# Patient Record
Sex: FEMALE | Race: BLACK OR AFRICAN AMERICAN | NOT HISPANIC OR LATINO | URBAN - METROPOLITAN AREA
[De-identification: names, ages, dates, MRNs, and addresses within clinical notes are randomized per-mention and may not be internally consistent; named-entity substitution may affect disease eponyms.]

---

## 2017-04-19 ENCOUNTER — EMERGENCY (EMERGENCY)
Facility: HOSPITAL | Age: 62
LOS: 1 days | Discharge: HOME | End: 2017-04-19
Attending: EMERGENCY MEDICINE
Payer: SELF-PAY

## 2017-04-19 VITALS
OXYGEN SATURATION: 96 % | TEMPERATURE: 98 F | RESPIRATION RATE: 18 BRPM | HEART RATE: 89 BPM | DIASTOLIC BLOOD PRESSURE: 76 MMHG | SYSTOLIC BLOOD PRESSURE: 149 MMHG

## 2017-04-19 VITALS
DIASTOLIC BLOOD PRESSURE: 110 MMHG | TEMPERATURE: 98 F | HEART RATE: 82 BPM | WEIGHT: 160.06 LBS | RESPIRATION RATE: 16 BRPM | SYSTOLIC BLOOD PRESSURE: 169 MMHG | OXYGEN SATURATION: 100 %

## 2017-04-19 DIAGNOSIS — S02.2XXA FRACTURE OF NASAL BONES, INITIAL ENCOUNTER FOR CLOSED FRACTURE: ICD-10-CM
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
## 2017-04-19 DIAGNOSIS — F17.200 NICOTINE DEPENDENCE, UNSPECIFIED, UNCOMPLICATED: ICD-10-CM

## 2017-04-19 DIAGNOSIS — S01.81XA LACERATION WITHOUT FOREIGN BODY OF OTHER PART OF HEAD, INITIAL ENCOUNTER: ICD-10-CM
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
## 2017-04-19 DIAGNOSIS — S09.90XA UNSPECIFIED INJURY OF HEAD, INITIAL ENCOUNTER: ICD-10-CM
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
## 2017-04-19 LAB
ALBUMIN SERPL ELPH-MCNC: 4.2 G/DL — SIGNIFICANT CHANGE UP (ref 3.3–5.2)
ALP SERPL-CCNC: 87 U/L — SIGNIFICANT CHANGE UP (ref 40–120)
ALT FLD-CCNC: 17 U/L — SIGNIFICANT CHANGE UP
ANION GAP SERPL CALC-SCNC: 12 MMOL/L — SIGNIFICANT CHANGE UP (ref 5–17)
APTT BLD: 28.6 SEC — SIGNIFICANT CHANGE UP (ref 27.5–37.4)
AST SERPL-CCNC: 25 U/L — SIGNIFICANT CHANGE UP
BASOPHILS # BLD AUTO: 0 K/UL — SIGNIFICANT CHANGE UP (ref 0–0.2)
BASOPHILS NFR BLD AUTO: 0.1 % — SIGNIFICANT CHANGE UP (ref 0–2)
BILIRUB SERPL-MCNC: 0.5 MG/DL — SIGNIFICANT CHANGE UP (ref 0.4–2)
BUN SERPL-MCNC: 24 MG/DL — HIGH (ref 8–20)
CALCIUM SERPL-MCNC: 9.5 MG/DL — SIGNIFICANT CHANGE UP (ref 8.6–10.2)
CHLORIDE SERPL-SCNC: 93 MMOL/L — LOW (ref 98–107)
CK MB CFR SERPL CALC: 11.9 NG/ML — HIGH (ref 0–6.7)
CK SERPL-CCNC: 468 U/L — HIGH (ref 25–170)
CO2 SERPL-SCNC: 26 MMOL/L — SIGNIFICANT CHANGE UP (ref 22–29)
CREAT SERPL-MCNC: 0.81 MG/DL — SIGNIFICANT CHANGE UP (ref 0.5–1.3)
EOSINOPHIL # BLD AUTO: 0 K/UL — SIGNIFICANT CHANGE UP (ref 0–0.5)
EOSINOPHIL NFR BLD AUTO: 0.1 % — SIGNIFICANT CHANGE UP (ref 0–6)
ETHANOL SERPL-MCNC: <10 MG/DL — SIGNIFICANT CHANGE UP
GLUCOSE SERPL-MCNC: 336 MG/DL — HIGH (ref 70–115)
HCT VFR BLD CALC: 40 % — SIGNIFICANT CHANGE UP (ref 37–47)
HGB BLD-MCNC: 13.7 G/DL — SIGNIFICANT CHANGE UP (ref 12–16)
INR BLD: 1 RATIO — SIGNIFICANT CHANGE UP (ref 0.88–1.16)
LYMPHOCYTES # BLD AUTO: 1.4 K/UL — SIGNIFICANT CHANGE UP (ref 1–4.8)
LYMPHOCYTES # BLD AUTO: 8.7 % — LOW (ref 20–55)
MCHC RBC-ENTMCNC: 31.1 PG — HIGH (ref 27–31)
MCHC RBC-ENTMCNC: 34.3 G/DL — SIGNIFICANT CHANGE UP (ref 32–36)
MCV RBC AUTO: 90.7 FL — SIGNIFICANT CHANGE UP (ref 81–99)
MONOCYTES # BLD AUTO: 0.9 K/UL — HIGH (ref 0–0.8)
MONOCYTES NFR BLD AUTO: 5.7 % — SIGNIFICANT CHANGE UP (ref 3–10)
NEUTROPHILS # BLD AUTO: 13.8 K/UL — HIGH (ref 1.8–8)
NEUTROPHILS NFR BLD AUTO: 85.2 % — HIGH (ref 37–73)
PLATELET # BLD AUTO: 265 K/UL — SIGNIFICANT CHANGE UP (ref 150–400)
POTASSIUM SERPL-MCNC: 4.4 MMOL/L — SIGNIFICANT CHANGE UP (ref 3.5–5.3)
POTASSIUM SERPL-SCNC: 4.4 MMOL/L — SIGNIFICANT CHANGE UP (ref 3.5–5.3)
PROT SERPL-MCNC: 7.8 G/DL — SIGNIFICANT CHANGE UP (ref 6.6–8.7)
PROTHROM AB SERPL-ACNC: 11 SEC — SIGNIFICANT CHANGE UP (ref 9.8–12.7)
RBC # BLD: 4.41 M/UL — SIGNIFICANT CHANGE UP (ref 4.4–5.2)
RBC # FLD: 12.4 % — SIGNIFICANT CHANGE UP (ref 11–15.6)
SODIUM SERPL-SCNC: 131 MMOL/L — LOW (ref 135–145)
TROPONIN T SERPL-MCNC: <0.01 NG/ML — SIGNIFICANT CHANGE UP (ref 0–0.06)
WBC # BLD: 16.2 K/UL — HIGH (ref 4.8–10.8)
WBC # FLD AUTO: 16.2 K/UL — HIGH (ref 4.8–10.8)

## 2017-04-19 PROCEDURE — 82550 ASSAY OF CK (CPK): CPT

## 2017-04-19 PROCEDURE — 99284 EMERGENCY DEPT VISIT MOD MDM: CPT | Mod: 25

## 2017-04-19 PROCEDURE — 12011 RPR F/E/E/N/L/M 2.5 CM/<: CPT

## 2017-04-19 PROCEDURE — 80307 DRUG TEST PRSMV CHEM ANLYZR: CPT

## 2017-04-19 PROCEDURE — 72125 CT NECK SPINE W/O DYE: CPT | Mod: 26

## 2017-04-19 PROCEDURE — 96375 TX/PRO/DX INJ NEW DRUG ADDON: CPT | Mod: XU

## 2017-04-19 PROCEDURE — 70486 CT MAXILLOFACIAL W/O DYE: CPT

## 2017-04-19 PROCEDURE — 85610 PROTHROMBIN TIME: CPT

## 2017-04-19 PROCEDURE — 93010 ELECTROCARDIOGRAM REPORT: CPT

## 2017-04-19 PROCEDURE — 99285 EMERGENCY DEPT VISIT HI MDM: CPT | Mod: 25

## 2017-04-19 PROCEDURE — 71260 CT THORAX DX C+: CPT | Mod: 26

## 2017-04-19 PROCEDURE — 96374 THER/PROPH/DIAG INJ IV PUSH: CPT | Mod: XU

## 2017-04-19 PROCEDURE — 70450 CT HEAD/BRAIN W/O DYE: CPT

## 2017-04-19 PROCEDURE — 84484 ASSAY OF TROPONIN QUANT: CPT

## 2017-04-19 PROCEDURE — 71260 CT THORAX DX C+: CPT

## 2017-04-19 PROCEDURE — 72125 CT NECK SPINE W/O DYE: CPT

## 2017-04-19 PROCEDURE — 80053 COMPREHEN METABOLIC PANEL: CPT

## 2017-04-19 PROCEDURE — 74177 CT ABD & PELVIS W/CONTRAST: CPT | Mod: 26

## 2017-04-19 PROCEDURE — 70450 CT HEAD/BRAIN W/O DYE: CPT | Mod: 26

## 2017-04-19 PROCEDURE — 82553 CREATINE MB FRACTION: CPT

## 2017-04-19 PROCEDURE — 85730 THROMBOPLASTIN TIME PARTIAL: CPT

## 2017-04-19 PROCEDURE — 70486 CT MAXILLOFACIAL W/O DYE: CPT | Mod: 26

## 2017-04-19 PROCEDURE — 85027 COMPLETE CBC AUTOMATED: CPT

## 2017-04-19 PROCEDURE — 93005 ELECTROCARDIOGRAM TRACING: CPT

## 2017-04-19 PROCEDURE — 74177 CT ABD & PELVIS W/CONTRAST: CPT

## 2017-04-19 RX ORDER — AMOXICILLIN 250 MG/5ML
1 SUSPENSION, RECONSTITUTED, ORAL (ML) ORAL
Qty: 30 | Refills: 0 | OUTPATIENT
Start: 2017-04-19 | End: 2017-04-29

## 2017-04-19 RX ORDER — ONDANSETRON 8 MG/1
4 TABLET, FILM COATED ORAL ONCE
Qty: 0 | Refills: 0 | Status: COMPLETED | OUTPATIENT
Start: 2017-04-19 | End: 2017-04-19

## 2017-04-19 RX ORDER — SODIUM CHLORIDE 9 MG/ML
500 INJECTION INTRAMUSCULAR; INTRAVENOUS; SUBCUTANEOUS ONCE
Qty: 0 | Refills: 0 | Status: COMPLETED | OUTPATIENT
Start: 2017-04-19 | End: 2017-04-19

## 2017-04-19 RX ORDER — MORPHINE SULFATE 50 MG/1
4 CAPSULE, EXTENDED RELEASE ORAL ONCE
Qty: 0 | Refills: 0 | Status: DISCONTINUED | OUTPATIENT
Start: 2017-04-19 | End: 2017-04-19

## 2017-04-19 RX ORDER — AMPICILLIN SODIUM AND SULBACTAM SODIUM 250; 125 MG/ML; MG/ML
2 INJECTION, POWDER, FOR SUSPENSION INTRAMUSCULAR; INTRAVENOUS ONCE
Qty: 0 | Refills: 0 | Status: COMPLETED | OUTPATIENT
Start: 2017-04-19 | End: 2017-04-19

## 2017-04-19 RX ORDER — AMOXICILLIN 250 MG/5ML
1 SUSPENSION, RECONSTITUTED, ORAL (ML) ORAL
Qty: 30 | Refills: 0
Start: 2017-04-19 | End: 2017-04-29

## 2017-04-19 RX ADMIN — MORPHINE SULFATE 4 MILLIGRAM(S): 50 CAPSULE, EXTENDED RELEASE ORAL at 11:17

## 2017-04-19 RX ADMIN — AMPICILLIN SODIUM AND SULBACTAM SODIUM 200 GRAM(S): 250; 125 INJECTION, POWDER, FOR SUSPENSION INTRAMUSCULAR; INTRAVENOUS at 18:36

## 2017-04-19 RX ADMIN — SODIUM CHLORIDE 500 MILLILITER(S): 9 INJECTION INTRAMUSCULAR; INTRAVENOUS; SUBCUTANEOUS at 16:42

## 2017-04-19 RX ADMIN — ONDANSETRON 4 MILLIGRAM(S): 8 TABLET, FILM COATED ORAL at 11:17

## 2017-04-19 RX ADMIN — MORPHINE SULFATE 4 MILLIGRAM(S): 50 CAPSULE, EXTENDED RELEASE ORAL at 12:24

## 2017-04-19 NOTE — ED PROVIDER NOTE - CARE PLAN
Principal Discharge DX:	Injury of head, initial encounter Principal Discharge DX:	Injury of head, initial encounter  Secondary Diagnosis:	Closed fracture of nasal bone, initial encounter  Secondary Diagnosis:	Laceration

## 2017-04-19 NOTE — ED ADULT NURSE REASSESSMENT NOTE - NS ED NURSE REASSESS COMMENT FT1
rcd pt on stretcher, in no apparent distress, pt a/ox3, swelling and dry blood noted to lip and nose, awaiting suture, complains she wants to eat, per MD Vega pt can have liquids, family at bedside will continue to monitor

## 2017-04-19 NOTE — ED PROVIDER NOTE - OBJECTIVE STATEMENT
63 yo female  pmh dm comes st ed s/p fall down  steps  pt with trauma to face and mouth; pt states has pain to rt side of neck; denies chest, abdominal and back pain; unknown loc

## 2017-04-19 NOTE — ED ADULT TRIAGE NOTE - CHIEF COMPLAINT QUOTE
pt presents to ed with unclear story. states that she believes she fell at approx 330 am because it was dark and she couldn't see. unknown loc. dr solitario to bedside. patient with swollen lower part of face, bleeding to nose, broken teeth and right sided c spine tenderness. arrives in collar. no anticoags.

## 2018-09-21 NOTE — ED ADULT NURSE NOTE - CHIEF COMPLAINT QUOTE
pt presents to ed with unclear story. states that she believes she fell at approx 330 am because it was dark and she couldn't see. unknown loc. dr solitario to bedside. patient with swollen lower part of face, bleeding to nose, broken teeth and right sided c spine tenderness. arrives in collar. no anticoags. declined

## 2021-09-16 ENCOUNTER — INPATIENT (INPATIENT)
Facility: HOSPITAL | Age: 66
LOS: 5 days | Discharge: HOME HEALTH | End: 2021-09-22
Attending: HOSPITALIST | Admitting: HOSPITALIST
Payer: MEDICARE

## 2021-09-16 VITALS
SYSTOLIC BLOOD PRESSURE: 224 MMHG | OXYGEN SATURATION: 99 % | DIASTOLIC BLOOD PRESSURE: 114 MMHG | TEMPERATURE: 97 F | HEART RATE: 99 BPM | RESPIRATION RATE: 16 BRPM

## 2021-09-16 DIAGNOSIS — Z87.81 PERSONAL HISTORY OF (HEALED) TRAUMATIC FRACTURE: Chronic | ICD-10-CM

## 2021-09-16 DIAGNOSIS — I16.1 HYPERTENSIVE EMERGENCY: ICD-10-CM

## 2021-09-16 LAB
A1C WITH ESTIMATED AVERAGE GLUCOSE RESULT: 12.2 % — HIGH (ref 4–5.6)
ALBUMIN SERPL ELPH-MCNC: 4.6 G/DL — SIGNIFICANT CHANGE UP (ref 3.3–5)
ALP SERPL-CCNC: 78 U/L — SIGNIFICANT CHANGE UP (ref 40–120)
ALT FLD-CCNC: 23 U/L — SIGNIFICANT CHANGE UP (ref 4–33)
ANION GAP SERPL CALC-SCNC: 16 MMOL/L — HIGH (ref 7–14)
APPEARANCE UR: CLEAR — SIGNIFICANT CHANGE UP
AST SERPL-CCNC: 30 U/L — SIGNIFICANT CHANGE UP (ref 4–32)
BACTERIA # UR AUTO: ABNORMAL
BASE EXCESS BLDV CALC-SCNC: -3.7 MMOL/L — LOW (ref -2–3)
BASOPHILS # BLD AUTO: 0.05 K/UL — SIGNIFICANT CHANGE UP (ref 0–0.2)
BASOPHILS NFR BLD AUTO: 0.6 % — SIGNIFICANT CHANGE UP (ref 0–2)
BILIRUB SERPL-MCNC: 0.2 MG/DL — SIGNIFICANT CHANGE UP (ref 0.2–1.2)
BILIRUB UR-MCNC: NEGATIVE — SIGNIFICANT CHANGE UP
BLOOD GAS VENOUS COMPREHENSIVE RESULT: SIGNIFICANT CHANGE UP
BUN SERPL-MCNC: 33 MG/DL — HIGH (ref 7–23)
CALCIUM SERPL-MCNC: 9.4 MG/DL — SIGNIFICANT CHANGE UP (ref 8.4–10.5)
CHLORIDE BLDV-SCNC: 105 MMOL/L — SIGNIFICANT CHANGE UP (ref 96–108)
CHLORIDE SERPL-SCNC: 100 MMOL/L — SIGNIFICANT CHANGE UP (ref 98–107)
CO2 BLDV-SCNC: 23.4 MMOL/L — SIGNIFICANT CHANGE UP (ref 22–26)
CO2 SERPL-SCNC: 19 MMOL/L — LOW (ref 22–31)
COLOR SPEC: SIGNIFICANT CHANGE UP
CREAT SERPL-MCNC: 2.09 MG/DL — HIGH (ref 0.5–1.3)
DIFF PNL FLD: ABNORMAL
EOSINOPHIL # BLD AUTO: 0.1 K/UL — SIGNIFICANT CHANGE UP (ref 0–0.5)
EOSINOPHIL NFR BLD AUTO: 1.1 % — SIGNIFICANT CHANGE UP (ref 0–6)
EPI CELLS # UR: 2 /HPF — SIGNIFICANT CHANGE UP (ref 0–5)
ESTIMATED AVERAGE GLUCOSE: 303 — SIGNIFICANT CHANGE UP
GAS PNL BLDV: 135 MMOL/L — LOW (ref 136–145)
GLUCOSE BLDV-MCNC: 272 MG/DL — HIGH (ref 70–99)
GLUCOSE SERPL-MCNC: 307 MG/DL — HIGH (ref 70–99)
GLUCOSE UR QL: ABNORMAL
HCO3 BLDV-SCNC: 22 MMOL/L — SIGNIFICANT CHANGE UP (ref 22–29)
HCT VFR BLD CALC: 32.5 % — LOW (ref 34.5–45)
HCT VFR BLDA CALC: 31 % — LOW (ref 34.5–46.5)
HGB BLD CALC-MCNC: 10.3 G/DL — LOW (ref 11.5–15.5)
HGB BLD-MCNC: 11 G/DL — LOW (ref 11.5–15.5)
HYALINE CASTS # UR AUTO: 0 /LPF — SIGNIFICANT CHANGE UP (ref 0–7)
IANC: 6.13 K/UL — SIGNIFICANT CHANGE UP (ref 1.5–8.5)
IMM GRANULOCYTES NFR BLD AUTO: 0.2 % — SIGNIFICANT CHANGE UP (ref 0–1.5)
KETONES UR-MCNC: NEGATIVE — SIGNIFICANT CHANGE UP
LACTATE BLDV-MCNC: 1 MMOL/L — SIGNIFICANT CHANGE UP (ref 0.5–2)
LEUKOCYTE ESTERASE UR-ACNC: NEGATIVE — SIGNIFICANT CHANGE UP
LYMPHOCYTES # BLD AUTO: 2.07 K/UL — SIGNIFICANT CHANGE UP (ref 1–3.3)
LYMPHOCYTES # BLD AUTO: 22.8 % — SIGNIFICANT CHANGE UP (ref 13–44)
MCHC RBC-ENTMCNC: 28.9 PG — SIGNIFICANT CHANGE UP (ref 27–34)
MCHC RBC-ENTMCNC: 33.8 GM/DL — SIGNIFICANT CHANGE UP (ref 32–36)
MCV RBC AUTO: 85.3 FL — SIGNIFICANT CHANGE UP (ref 80–100)
MONOCYTES # BLD AUTO: 0.72 K/UL — SIGNIFICANT CHANGE UP (ref 0–0.9)
MONOCYTES NFR BLD AUTO: 7.9 % — SIGNIFICANT CHANGE UP (ref 2–14)
NEUTROPHILS # BLD AUTO: 6.13 K/UL — SIGNIFICANT CHANGE UP (ref 1.8–7.4)
NEUTROPHILS NFR BLD AUTO: 67.4 % — SIGNIFICANT CHANGE UP (ref 43–77)
NITRITE UR-MCNC: NEGATIVE — SIGNIFICANT CHANGE UP
NRBC # BLD: 0 /100 WBCS — SIGNIFICANT CHANGE UP
NRBC # FLD: 0 K/UL — SIGNIFICANT CHANGE UP
NT-PROBNP SERPL-SCNC: 536 PG/ML — HIGH
PCO2 BLDV: 42 MMHG — SIGNIFICANT CHANGE UP (ref 39–42)
PH BLDV: 7.33 — SIGNIFICANT CHANGE UP (ref 7.32–7.43)
PH UR: 6.5 — SIGNIFICANT CHANGE UP (ref 5–8)
PLATELET # BLD AUTO: 262 K/UL — SIGNIFICANT CHANGE UP (ref 150–400)
PO2 BLDV: 32 MMHG — SIGNIFICANT CHANGE UP
POTASSIUM BLDV-SCNC: 4.1 MMOL/L — SIGNIFICANT CHANGE UP (ref 3.5–5.1)
POTASSIUM SERPL-MCNC: 4.1 MMOL/L — SIGNIFICANT CHANGE UP (ref 3.5–5.3)
POTASSIUM SERPL-SCNC: 4.1 MMOL/L — SIGNIFICANT CHANGE UP (ref 3.5–5.3)
PROT SERPL-MCNC: 7.9 G/DL — SIGNIFICANT CHANGE UP (ref 6–8.3)
PROT UR-MCNC: ABNORMAL
RBC # BLD: 3.81 M/UL — SIGNIFICANT CHANGE UP (ref 3.8–5.2)
RBC # FLD: 12.1 % — SIGNIFICANT CHANGE UP (ref 10.3–14.5)
RBC CASTS # UR COMP ASSIST: 3 /HPF — SIGNIFICANT CHANGE UP (ref 0–4)
SAO2 % BLDV: 56.2 % — SIGNIFICANT CHANGE UP
SODIUM SERPL-SCNC: 135 MMOL/L — SIGNIFICANT CHANGE UP (ref 135–145)
SP GR SPEC: 1.01 — SIGNIFICANT CHANGE UP (ref 1–1.05)
TROPONIN T, HIGH SENSITIVITY RESULT: 27 NG/L — SIGNIFICANT CHANGE UP
TROPONIN T, HIGH SENSITIVITY RESULT: 28 NG/L — SIGNIFICANT CHANGE UP
UROBILINOGEN FLD QL: SIGNIFICANT CHANGE UP
WBC # BLD: 9.09 K/UL — SIGNIFICANT CHANGE UP (ref 3.8–10.5)
WBC # FLD AUTO: 9.09 K/UL — SIGNIFICANT CHANGE UP (ref 3.8–10.5)
WBC UR QL: 2 /HPF — SIGNIFICANT CHANGE UP (ref 0–5)

## 2021-09-16 PROCEDURE — 71045 X-RAY EXAM CHEST 1 VIEW: CPT | Mod: 26

## 2021-09-16 PROCEDURE — 99285 EMERGENCY DEPT VISIT HI MDM: CPT

## 2021-09-16 RX ORDER — LABETALOL HCL 100 MG
10 TABLET ORAL ONCE
Refills: 0 | Status: COMPLETED | OUTPATIENT
Start: 2021-09-16 | End: 2021-09-16

## 2021-09-16 RX ADMIN — Medication 10 MILLIGRAM(S): at 20:21

## 2021-09-16 NOTE — ED PROVIDER NOTE - PHYSICAL EXAMINATION
Attending/Oren: Well-appearing, NAD; PERRL/EOMI, non-icterus, supple, no CHACHO, no JVD, RRR, CTAB; Abd-soft, NT/ND, no HSM; +1 pitting LE edema, A&Ox3, nonfocal; Skin-warm/dry General: NAD  HEENT: NCAT, PERRL  Cardiac: RRR, no murmurs, 2+ peripheral pulses  Chest: CTA  Abdomen: soft, non-distended, bowel sounds present, no ttp, no rebound or guarding, -cva ttp  Extremities: 1+peripheral edema bilaterally, no calf tenderness, or leg size discrepancies  Skin: no rashes  Neuro: AAOx3, motor and sensory grossly intact  Psych: mood and affect appropriate      Attending/Oren: Well-appearing, NAD; PERRL/EOMI, non-icterus, supple, no CHACHO, no JVD, RRR, CTAB; Abd-soft, NT/ND, no HSM; +1 pitting LE edema, A&Ox3, nonfocal; Skin-warm/dry

## 2021-09-16 NOTE — ED PROVIDER NOTE - OBJECTIVE STATEMENT
Attending/Oren: 65 yo F w h/o HTN, DM p/w ~ two days of generalized weakness, lightheadedness, and LE swelling. Pt denies CP, SOB/MARCELO, orthopnea or palp. Pt was seen and eval by her PMD today ([facility]-Dr. Geni Fairbanks) and referred to the ED.    Januvia 25mg qD; Amlodipine 10mg qD; Losartan 100mg qD

## 2021-09-16 NOTE — ED PROVIDER NOTE - PROGRESS NOTE DETAILS
ABDI Batista (PGY-2) - BP from 200s to 189 systolic s/p labetalol. labs w/ mildly elevated bmp, creatinine for 2 w/o electrolyte issues. Trop 28 will rpt for delta.

## 2021-09-16 NOTE — ED ADULT NURSE NOTE - CHIEF COMPLAINT QUOTE
Pt presents to ED for high blood pressure and b/l LE swelling x4days. Denies headache, chest pain, dizziness, lightheadedness. PMhx of HTN, DM

## 2021-09-16 NOTE — ED PROVIDER NOTE - CLINICAL SUMMARY MEDICAL DECISION MAKING FREE TEXT BOX
A/P 67 yo HTN, DM referred for hypertensive urgency, acute renal injury  -EKG, labs, CXR, blood pressure management, CDU/admit

## 2021-09-16 NOTE — ED ADULT NURSE NOTE - OBJECTIVE STATEMENT
Donna RN: 67 y/o F received to room 1 c/o HTN. Pt a&ox4 and ambulatory. pt primarily Zimbabwean speaking but able to make needs known in english. Pt states for 2 days shes had swelling to her legs, weakness and lightheadedness. Pt states she was seen at her pcp and was told to come to ed for HTN. Pt respirations even and unlabored. Pt abd soft nontender nondistended. Pt skin intact. Pt denies fevers, chills, sob, luna, ha, cp, palpitations. VS as noted, call bell in reach, comfort measures provided, 20G IV Placed L wrist labs drawn and sent, will give report to primary RN.

## 2021-09-16 NOTE — ED ADULT NURSE NOTE - ED STAT RN HANDOFF DETAILS
Report given to UCHE Redd. Pt a&ox4 and ambulatory. Pt respirations even and unlabored. pt aware of plan of care. VS as noted, call bell in reach, will continue to monitor till transport.

## 2021-09-17 DIAGNOSIS — Z29.9 ENCOUNTER FOR PROPHYLACTIC MEASURES, UNSPECIFIED: ICD-10-CM

## 2021-09-17 DIAGNOSIS — I16.0 HYPERTENSIVE URGENCY: ICD-10-CM

## 2021-09-17 DIAGNOSIS — R60.0 LOCALIZED EDEMA: ICD-10-CM

## 2021-09-17 DIAGNOSIS — I10 ESSENTIAL (PRIMARY) HYPERTENSION: ICD-10-CM

## 2021-09-17 DIAGNOSIS — R94.31 ABNORMAL ELECTROCARDIOGRAM [ECG] [EKG]: ICD-10-CM

## 2021-09-17 DIAGNOSIS — R09.89 OTHER SPECIFIED SYMPTOMS AND SIGNS INVOLVING THE CIRCULATORY AND RESPIRATORY SYSTEMS: ICD-10-CM

## 2021-09-17 DIAGNOSIS — Z02.9 ENCOUNTER FOR ADMINISTRATIVE EXAMINATIONS, UNSPECIFIED: ICD-10-CM

## 2021-09-17 DIAGNOSIS — E78.5 HYPERLIPIDEMIA, UNSPECIFIED: ICD-10-CM

## 2021-09-17 DIAGNOSIS — E11.65 TYPE 2 DIABETES MELLITUS WITH HYPERGLYCEMIA: ICD-10-CM

## 2021-09-17 DIAGNOSIS — N17.9 ACUTE KIDNEY FAILURE, UNSPECIFIED: ICD-10-CM

## 2021-09-17 LAB
A1C WITH ESTIMATED AVERAGE GLUCOSE RESULT: 12.4 % — HIGH (ref 4–5.6)
ANION GAP SERPL CALC-SCNC: 11 MMOL/L — SIGNIFICANT CHANGE UP (ref 7–14)
BASOPHILS # BLD AUTO: 0.04 K/UL — SIGNIFICANT CHANGE UP (ref 0–0.2)
BASOPHILS NFR BLD AUTO: 0.5 % — SIGNIFICANT CHANGE UP (ref 0–2)
BUN SERPL-MCNC: 28 MG/DL — HIGH (ref 7–23)
CALCIUM SERPL-MCNC: 9.5 MG/DL — SIGNIFICANT CHANGE UP (ref 8.4–10.5)
CHLORIDE SERPL-SCNC: 103 MMOL/L — SIGNIFICANT CHANGE UP (ref 98–107)
CHOLEST SERPL-MCNC: 386 MG/DL — HIGH
CO2 SERPL-SCNC: 25 MMOL/L — SIGNIFICANT CHANGE UP (ref 22–31)
CREAT SERPL-MCNC: 1.84 MG/DL — HIGH (ref 0.5–1.3)
EOSINOPHIL # BLD AUTO: 0.1 K/UL — SIGNIFICANT CHANGE UP (ref 0–0.5)
EOSINOPHIL NFR BLD AUTO: 1.3 % — SIGNIFICANT CHANGE UP (ref 0–6)
ESTIMATED AVERAGE GLUCOSE: 309 — SIGNIFICANT CHANGE UP
GLUCOSE BLDC GLUCOMTR-MCNC: 248 MG/DL — HIGH (ref 70–99)
GLUCOSE BLDC GLUCOMTR-MCNC: 260 MG/DL — HIGH (ref 70–99)
GLUCOSE BLDC GLUCOMTR-MCNC: 384 MG/DL — HIGH (ref 70–99)
GLUCOSE SERPL-MCNC: 266 MG/DL — HIGH (ref 70–99)
HCT VFR BLD CALC: 32.3 % — LOW (ref 34.5–45)
HDLC SERPL-MCNC: 53 MG/DL — SIGNIFICANT CHANGE UP
HGB BLD-MCNC: 11 G/DL — LOW (ref 11.5–15.5)
IANC: 5.4 K/UL — SIGNIFICANT CHANGE UP (ref 1.5–8.5)
IMM GRANULOCYTES NFR BLD AUTO: 0.3 % — SIGNIFICANT CHANGE UP (ref 0–1.5)
LIPID PNL WITH DIRECT LDL SERPL: 277 MG/DL — HIGH
LYMPHOCYTES # BLD AUTO: 1.57 K/UL — SIGNIFICANT CHANGE UP (ref 1–3.3)
LYMPHOCYTES # BLD AUTO: 20.6 % — SIGNIFICANT CHANGE UP (ref 13–44)
MAGNESIUM SERPL-MCNC: 2.2 MG/DL — SIGNIFICANT CHANGE UP (ref 1.6–2.6)
MCHC RBC-ENTMCNC: 28.7 PG — SIGNIFICANT CHANGE UP (ref 27–34)
MCHC RBC-ENTMCNC: 34.1 GM/DL — SIGNIFICANT CHANGE UP (ref 32–36)
MCV RBC AUTO: 84.3 FL — SIGNIFICANT CHANGE UP (ref 80–100)
MONOCYTES # BLD AUTO: 0.49 K/UL — SIGNIFICANT CHANGE UP (ref 0–0.9)
MONOCYTES NFR BLD AUTO: 6.4 % — SIGNIFICANT CHANGE UP (ref 2–14)
NEUTROPHILS # BLD AUTO: 5.4 K/UL — SIGNIFICANT CHANGE UP (ref 1.8–7.4)
NEUTROPHILS NFR BLD AUTO: 70.9 % — SIGNIFICANT CHANGE UP (ref 43–77)
NON HDL CHOLESTEROL: 333 MG/DL — HIGH
NRBC # BLD: 0 /100 WBCS — SIGNIFICANT CHANGE UP
NRBC # FLD: 0 K/UL — SIGNIFICANT CHANGE UP
PHOSPHATE SERPL-MCNC: 2.9 MG/DL — SIGNIFICANT CHANGE UP (ref 2.5–4.5)
PLATELET # BLD AUTO: 249 K/UL — SIGNIFICANT CHANGE UP (ref 150–400)
POTASSIUM SERPL-MCNC: 4 MMOL/L — SIGNIFICANT CHANGE UP (ref 3.5–5.3)
POTASSIUM SERPL-SCNC: 4 MMOL/L — SIGNIFICANT CHANGE UP (ref 3.5–5.3)
RBC # BLD: 3.83 M/UL — SIGNIFICANT CHANGE UP (ref 3.8–5.2)
RBC # FLD: 12 % — SIGNIFICANT CHANGE UP (ref 10.3–14.5)
SARS-COV-2 RNA SPEC QL NAA+PROBE: SIGNIFICANT CHANGE UP
SODIUM SERPL-SCNC: 139 MMOL/L — SIGNIFICANT CHANGE UP (ref 135–145)
TRIGL SERPL-MCNC: 281 MG/DL — HIGH
TSH SERPL-MCNC: 1.48 UIU/ML — SIGNIFICANT CHANGE UP (ref 0.27–4.2)
WBC # BLD: 7.62 K/UL — SIGNIFICANT CHANGE UP (ref 3.8–10.5)
WBC # FLD AUTO: 7.62 K/UL — SIGNIFICANT CHANGE UP (ref 3.8–10.5)

## 2021-09-17 PROCEDURE — 99223 1ST HOSP IP/OBS HIGH 75: CPT

## 2021-09-17 PROCEDURE — 93010 ELECTROCARDIOGRAM REPORT: CPT

## 2021-09-17 PROCEDURE — 12345: CPT | Mod: NC

## 2021-09-17 RX ORDER — LABETALOL HCL 100 MG
100 TABLET ORAL EVERY 12 HOURS
Refills: 0 | Status: DISCONTINUED | OUTPATIENT
Start: 2021-09-17 | End: 2021-09-22

## 2021-09-17 RX ORDER — HEPARIN SODIUM 5000 [USP'U]/ML
5000 INJECTION INTRAVENOUS; SUBCUTANEOUS EVERY 8 HOURS
Refills: 0 | Status: DISCONTINUED | OUTPATIENT
Start: 2021-09-17 | End: 2021-09-22

## 2021-09-17 RX ORDER — INSULIN LISPRO 100/ML
4 VIAL (ML) SUBCUTANEOUS ONCE
Refills: 0 | Status: COMPLETED | OUTPATIENT
Start: 2021-09-17 | End: 2021-09-17

## 2021-09-17 RX ORDER — ATORVASTATIN CALCIUM 80 MG/1
40 TABLET, FILM COATED ORAL AT BEDTIME
Refills: 0 | Status: DISCONTINUED | OUTPATIENT
Start: 2021-09-17 | End: 2021-09-22

## 2021-09-17 RX ORDER — INSULIN LISPRO 100/ML
5 VIAL (ML) SUBCUTANEOUS
Refills: 0 | Status: DISCONTINUED | OUTPATIENT
Start: 2021-09-17 | End: 2021-09-18

## 2021-09-17 RX ORDER — INSULIN LISPRO 100/ML
3 VIAL (ML) SUBCUTANEOUS
Refills: 0 | Status: DISCONTINUED | OUTPATIENT
Start: 2021-09-17 | End: 2021-09-17

## 2021-09-17 RX ORDER — INFLUENZA VIRUS VACCINE 15; 15; 15; 15 UG/.5ML; UG/.5ML; UG/.5ML; UG/.5ML
0.5 SUSPENSION INTRAMUSCULAR ONCE
Refills: 0 | Status: DISCONTINUED | OUTPATIENT
Start: 2021-09-17 | End: 2021-09-22

## 2021-09-17 RX ORDER — DEXTROSE 50 % IN WATER 50 %
25 SYRINGE (ML) INTRAVENOUS ONCE
Refills: 0 | Status: DISCONTINUED | OUTPATIENT
Start: 2021-09-17 | End: 2021-09-22

## 2021-09-17 RX ORDER — DEXTROSE 50 % IN WATER 50 %
15 SYRINGE (ML) INTRAVENOUS ONCE
Refills: 0 | Status: DISCONTINUED | OUTPATIENT
Start: 2021-09-17 | End: 2021-09-22

## 2021-09-17 RX ORDER — INSULIN GLARGINE 100 [IU]/ML
12 INJECTION, SOLUTION SUBCUTANEOUS AT BEDTIME
Refills: 0 | Status: DISCONTINUED | OUTPATIENT
Start: 2021-09-17 | End: 2021-09-18

## 2021-09-17 RX ORDER — GLUCAGON INJECTION, SOLUTION 0.5 MG/.1ML
1 INJECTION, SOLUTION SUBCUTANEOUS ONCE
Refills: 0 | Status: DISCONTINUED | OUTPATIENT
Start: 2021-09-17 | End: 2021-09-22

## 2021-09-17 RX ORDER — INSULIN LISPRO 100/ML
VIAL (ML) SUBCUTANEOUS
Refills: 0 | Status: DISCONTINUED | OUTPATIENT
Start: 2021-09-17 | End: 2021-09-17

## 2021-09-17 RX ORDER — SODIUM CHLORIDE 9 MG/ML
1000 INJECTION, SOLUTION INTRAVENOUS
Refills: 0 | Status: DISCONTINUED | OUTPATIENT
Start: 2021-09-17 | End: 2021-09-22

## 2021-09-17 RX ORDER — INSULIN GLARGINE 100 [IU]/ML
4 INJECTION, SOLUTION SUBCUTANEOUS AT BEDTIME
Refills: 0 | Status: DISCONTINUED | OUTPATIENT
Start: 2021-09-17 | End: 2021-09-17

## 2021-09-17 RX ORDER — AMLODIPINE BESYLATE 2.5 MG/1
10 TABLET ORAL EVERY 24 HOURS
Refills: 0 | Status: DISCONTINUED | OUTPATIENT
Start: 2021-09-17 | End: 2021-09-17

## 2021-09-17 RX ORDER — INSULIN LISPRO 100/ML
VIAL (ML) SUBCUTANEOUS AT BEDTIME
Refills: 0 | Status: DISCONTINUED | OUTPATIENT
Start: 2021-09-17 | End: 2021-09-22

## 2021-09-17 RX ORDER — AMLODIPINE BESYLATE 2.5 MG/1
10 TABLET ORAL EVERY 24 HOURS
Refills: 0 | Status: DISCONTINUED | OUTPATIENT
Start: 2021-09-17 | End: 2021-09-22

## 2021-09-17 RX ORDER — INSULIN LISPRO 100/ML
VIAL (ML) SUBCUTANEOUS
Refills: 0 | Status: DISCONTINUED | OUTPATIENT
Start: 2021-09-17 | End: 2021-09-22

## 2021-09-17 RX ORDER — DEXTROSE 50 % IN WATER 50 %
12.5 SYRINGE (ML) INTRAVENOUS ONCE
Refills: 0 | Status: DISCONTINUED | OUTPATIENT
Start: 2021-09-17 | End: 2021-09-22

## 2021-09-17 RX ORDER — LABETALOL HCL 100 MG
5 TABLET ORAL ONCE
Refills: 0 | Status: COMPLETED | OUTPATIENT
Start: 2021-09-17 | End: 2021-09-17

## 2021-09-17 RX ADMIN — Medication 1: at 22:46

## 2021-09-17 RX ADMIN — Medication 100 MILLIGRAM(S): at 17:25

## 2021-09-17 RX ADMIN — AMLODIPINE BESYLATE 10 MILLIGRAM(S): 2.5 TABLET ORAL at 12:51

## 2021-09-17 RX ADMIN — ATORVASTATIN CALCIUM 40 MILLIGRAM(S): 80 TABLET, FILM COATED ORAL at 21:57

## 2021-09-17 RX ADMIN — Medication 100 MILLIGRAM(S): at 06:27

## 2021-09-17 RX ADMIN — INSULIN GLARGINE 12 UNIT(S): 100 INJECTION, SOLUTION SUBCUTANEOUS at 21:56

## 2021-09-17 RX ADMIN — Medication 3 UNIT(S): at 08:17

## 2021-09-17 RX ADMIN — HEPARIN SODIUM 5000 UNIT(S): 5000 INJECTION INTRAVENOUS; SUBCUTANEOUS at 21:59

## 2021-09-17 RX ADMIN — Medication 5 UNIT(S): at 12:51

## 2021-09-17 RX ADMIN — Medication 5 MILLIGRAM(S): at 02:35

## 2021-09-17 RX ADMIN — Medication 5 UNIT(S): at 17:26

## 2021-09-17 RX ADMIN — Medication 3: at 08:17

## 2021-09-17 RX ADMIN — Medication 4 UNIT(S): at 03:30

## 2021-09-17 RX ADMIN — Medication 2: at 12:50

## 2021-09-17 RX ADMIN — Medication 1: at 17:24

## 2021-09-17 NOTE — H&P ADULT - ATTENDING COMMENTS
agree w/ above, b/l duplex r/o DVT, amlodipine 5 mg QD, labetalol 100 mg BID  start weight based insulin  endocrine consult for A1C 12

## 2021-09-17 NOTE — H&P ADULT - PROBLEM SELECTOR PLAN 4
1.  Name of PCP: Dr. Geni Fairbanks  2.  PCP Contacted on Admission: [ ] Y    [ ] N    3.  PCP contacted at Discharge: [ ] Y    [ ] N    [ ] N/A  4.  Post-Discharge Appointment Date and Location:  5.  Summary of Handoff given to PCP: LE dopplers ordered r/o DVT A1C: 12.5  Blood glucose: 384  patient is on Januvia  Hold oral hypoglycemics  s/p 4 units SC Admelog    Monitor FS  start lantus 4 units QHS   start admelog 3 units with meals  Endo consulted

## 2021-09-17 NOTE — H&P ADULT - PROBLEM SELECTOR PLAN 2
Monitor Cr  no previous Cr to compare; therefore, will treat as IVÁN  check urine lytes and renal ultrasound Monitor Cr  no previous Cr to compare; therefore, will treat as IVÁN  check urine lytes  check bladder scan

## 2021-09-17 NOTE — H&P ADULT - PROBLEM SELECTOR PROBLEM 4
Discharge planning issues Lower extremity edema Type 2 diabetes mellitus with hyperglycemia, without long-term current use of insulin

## 2021-09-17 NOTE — H&P ADULT - ASSESSMENT
65 y/o F with hx of HTN, diabetes type 2 presents with elevated blood pressure. admitted for hypertensive urgency

## 2021-09-17 NOTE — CONSULT NOTE ADULT - SUBJECTIVE AND OBJECTIVE BOX
HPI:  66 year old woman with PMH HTN, HLD, DM2, presents with elevated blood pressure. She recently went to [facility], where she was started on amlodipine, losartan and Januvia. She then followed up the PMD again who noted her blood pressure was high and advised to go back to the hospital. Consult called for evaluation of uncontrolled DM2. Patient seen at bedside, states that she was diagnosed with DM2 about 1 year ago. She was prescribed Januvia 25 mg daily last week when she was at [facility], but had to pay > $500 for it. She was not on medications for DM prior to that. States that she does have a glucometer at home and checks her BG in the morning, reports fasting BG ranging from 150s to 200s. She does not have neuropathy. Needs surgery for a cataract. She has CKD stage 3b-4. Diet wise, she admits to drinking a lot of soda and eating a high carb diet.    Regarding her insurance status - she has Medicare but does not have prescription drug coverage. States that she will have [employer] next month.      PAST MEDICAL & SURGICAL HISTORY:  HTN (hypertension)    DM2  H/O clavicle fracture      FAMILY HISTORY:  DM in father     Social History:  no cigarette use  no alcohol use    Outpatient Medications:  · 	losartan 100 mg oral tablet: 1 tab(s) orally once a day  · 	Januvia 25 mg oral tablet: 1 tab(s) orally once a day  · 	amLODIPine 10 mg oral tablet: 1 tab(s) orally once a day    MEDICATIONS  (STANDING):  amLODIPine   Tablet 10 milliGRAM(s) Oral every 24 hours  dextrose 40% Gel 15 Gram(s) Oral once  dextrose 5%. 1000 milliLiter(s) (50 mL/Hr) IV Continuous <Continuous>  dextrose 5%. 1000 milliLiter(s) (100 mL/Hr) IV Continuous <Continuous>  dextrose 50% Injectable 25 Gram(s) IV Push once  dextrose 50% Injectable 12.5 Gram(s) IV Push once  dextrose 50% Injectable 25 Gram(s) IV Push once  glucagon  Injectable 1 milliGRAM(s) IntraMuscular once  heparin   Injectable 5000 Unit(s) SubCutaneous every 8 hours  influenza   Vaccine 0.5 milliLiter(s) IntraMuscular once  insulin glargine Injectable (LANTUS) 4 Unit(s) SubCutaneous at bedtime  insulin lispro (ADMELOG) corrective regimen sliding scale   SubCutaneous three times a day before meals  insulin lispro (ADMELOG) corrective regimen sliding scale   SubCutaneous at bedtime  insulin lispro Injectable (ADMELOG) 3 Unit(s) SubCutaneous three times a day before meals  labetalol 100 milliGRAM(s) Oral every 12 hours    MEDICATIONS  (PRN):      Allergies  No Known Allergies    Review of Systems:  Constitutional: No fever  Eyes: No blurry vision  Neuro: No tremors  HEENT: No pain  Cardiovascular: No chest pain, palpitations  Respiratory: No SOB, no cough  GI: No nausea, vomiting, abdominal pain  : No dysuria  Skin: no rash  Endocrine: no polyuria, polydipsia    ALL OTHER SYSTEMS REVIEWED AND NEGATIVE    PHYSICAL EXAM:  VITALS: T(C): 36.6 (09-17-21 @ 10:10)  T(F): 97.9 (09-17-21 @ 10:10), Max: 98.6 (09-17-21 @ 01:42)  HR: 78 (09-17-21 @ 10:10) (78 - 99)  BP: 154/97 (09-17-21 @ 10:10) (154/97 - 224/114)  RR:  (15 - 20)  SpO2:  (99% - 100%)  Wt(kg): --  GENERAL: NAD, well-developed  EYES: No proptosis, anicteric  HEENT:  Atraumatic, Normocephalic  THYROID: Normal size, no palpable nodules  RESPIRATORY: Clear to auscultation bilaterally; No rales, rhonchi, wheezing  CARDIOVASCULAR: Regular rate and rhythm; No murmurs; no peripheral edema  GI: Soft, nontender, non distended, normal bowel sounds  SKIN: Dry, intact, No rashes  MUSCULOSKELETAL: Full range of motion, normal strength  PSYCH: Alert and oriented x 3, reactive affect, euthymic mood       POCT Blood Glucose.: 209 mg/dL (09-17-21 @ 11:56)  POCT Blood Glucose.: 260 mg/dL (09-17-21 @ 07:44) A 3, A 3  POCT Blood Glucose.: 248 mg/dL (09-17-21 @ 06:39)  POCT Blood Glucose.: 384 mg/dL (09-17-21 @ 02:33) A 4                          11.0   7.62  )-----------( 249      ( 17 Sep 2021 06:27 )             32.3       09-17    139  |  103  |  28<H>  ----------------------------<  266<H>  4.0   |  25  |  1.84<H>    EGFR if : 33<L>  EGFR if non : 28<L>    Ca    9.5      09-17  Mg     2.20     09-17  Phos  2.9     09-17    TPro  7.9  /  Alb  4.6  /  TBili  0.2  /  DBili  x   /  AST  30  /  ALT  23  /  AlkPhos  78  09-16      Thyroid Function Tests:  09-17 @ 06:27 TSH 1.48 FreeT4 -- T3 -- Anti TPO -- Anti Thyroglobulin Ab -- TSI --        09-17 Chol 386<H> Direct LDL -- LDL calculated 277<H> HDL 53 Trig 281<H>    HbA1c 12.2%

## 2021-09-17 NOTE — H&P ADULT - NSHPLABSRESULTS_GEN_ALL_CORE
11.0   9.09  )-----------( 262      ( 16 Sep 2021 21:05 )             32.5       09-16    135  |  100  |  33<H>  ----------------------------<  307<H>  4.1   |  19<L>  |  2.09<H>    Ca    9.4      16 Sep 2021 21:06    TPro  7.9  /  Alb  4.6  /  TBili  0.2  /  DBili  x   /  AST  30  /  ALT  23  /  AlkPhos  78  09-16      Trop: 27 --> 28 11.0   9.09  )-----------( 262      ( 16 Sep 2021 21:05 )             32.5       09-16    135  |  100  |  33<H>  ----------------------------<  307<H>  4.1   |  19<L>  |  2.09<H>    Ca    9.4      16 Sep 2021 21:06    TPro  7.9  /  Alb  4.6  /  TBili  0.2  /  DBili  x   /  AST  30  /  ALT  23  /  AlkPhos  78  09-16      Trop: 27 --> 28    EKG: NSR 79 TWi in V4-V6, Flat T in AVR

## 2021-09-17 NOTE — PROGRESS NOTE ADULT - PROBLEM SELECTOR PLAN 4
A1C: 12.5  Blood glucose: 384  as o/p patient is on Januvia, Hold oral hypoglycemics  case d/w dr. Morgan, will initiate insulin, pending insurance coverage may need 70/30.  diabetic and nutrition consult

## 2021-09-17 NOTE — PROGRESS NOTE ADULT - SUBJECTIVE AND OBJECTIVE BOX
Medicine Progress Note    Patient is a 66y old  Female who presents with a chief complaint of Hypertensive urgency (17 Sep 2021 12:03)      SUBJECTIVE / OVERNIGHT EVENTS: no current concerns, reports that she is unable to afford diabetes medications    ADDITIONAL REVIEW OF SYSTEMS:    MEDICATIONS  (STANDING):  amLODIPine   Tablet 10 milliGRAM(s) Oral every 24 hours  dextrose 40% Gel 15 Gram(s) Oral once  dextrose 5%. 1000 milliLiter(s) (50 mL/Hr) IV Continuous <Continuous>  dextrose 5%. 1000 milliLiter(s) (100 mL/Hr) IV Continuous <Continuous>  dextrose 50% Injectable 25 Gram(s) IV Push once  dextrose 50% Injectable 12.5 Gram(s) IV Push once  dextrose 50% Injectable 25 Gram(s) IV Push once  glucagon  Injectable 1 milliGRAM(s) IntraMuscular once  heparin   Injectable 5000 Unit(s) SubCutaneous every 8 hours  influenza   Vaccine 0.5 milliLiter(s) IntraMuscular once  insulin glargine Injectable (LANTUS) 12 Unit(s) SubCutaneous at bedtime  insulin lispro (ADMELOG) corrective regimen sliding scale   SubCutaneous three times a day before meals  insulin lispro (ADMELOG) corrective regimen sliding scale   SubCutaneous at bedtime  insulin lispro Injectable (ADMELOG) 5 Unit(s) SubCutaneous three times a day before meals  labetalol 100 milliGRAM(s) Oral every 12 hours    MEDICATIONS  (PRN):    CAPILLARY BLOOD GLUCOSE      POCT Blood Glucose.: 209 mg/dL (17 Sep 2021 11:56)  POCT Blood Glucose.: 260 mg/dL (17 Sep 2021 07:44)  POCT Blood Glucose.: 248 mg/dL (17 Sep 2021 06:39)  POCT Blood Glucose.: 384 mg/dL (17 Sep 2021 02:33)    I&O's Summary      PHYSICAL EXAM:  Vital Signs Last 24 Hrs  T(C): 36.6 (17 Sep 2021 10:10), Max: 37 (17 Sep 2021 01:42)  T(F): 97.9 (17 Sep 2021 10:10), Max: 98.6 (17 Sep 2021 01:42)  HR: 78 (17 Sep 2021 10:10) (78 - 99)  BP: 154/97 (17 Sep 2021 10:10) (154/97 - 224/114)  BP(mean): --  RR: 18 (17 Sep 2021 10:10) (15 - 20)  SpO2: 100% (17 Sep 2021 10:10) (99% - 100%)  CONSTITUTIONAL: NAD, well-developed, well-groomed  ENMT: Moist oral mucosa, no pharyngeal injection or exudates; normal dentition  RESPIRATORY: Normal respiratory effort; lungs are clear to auscultation bilaterally  CARDIOVASCULAR: Regular rate and rhythm, normal S1 and S2, no murmur/rub/gallop; No lower extremity edema; Peripheral pulses are 2+ bilaterally  ABDOMEN: Nontender to palpation, normoactive bowel sounds, no rebound/guarding; No hepatosplenomegaly  PSYCH: A+O to person, place, and time; affect appropriate  NEUROLOGY: CN 2-12 are intact and symmetric; no gross sensory deficits   SKIN: No rashes; no palpable lesions    LABS:                        11.0   7.62  )-----------( 249      ( 17 Sep 2021 06:27 )             32.3     09-17    139  |  103  |  28<H>  ----------------------------<  266<H>  4.0   |  25  |  1.84<H>    Ca    9.5      17 Sep 2021 06:27  Phos  2.9     09-17  Mg     2.20     09-17    TPro  7.9  /  Alb  4.6  /  TBili  0.2  /  DBili  x   /  AST  30  /  ALT  23  /  AlkPhos  78  09-16          Urinalysis Basic - ( 16 Sep 2021 22:31 )    Color: Light Yellow / Appearance: Clear / S.008 / pH: x  Gluc: x / Ketone: Negative  / Bili: Negative / Urobili: <2 mg/dL   Blood: x / Protein: 300 mg/dL / Nitrite: Negative   Leuk Esterase: Negative / RBC: 3 /HPF / WBC 2 /HPF   Sq Epi: x / Non Sq Epi: 2 /HPF / Bacteria: Few        COVID-19 PCR: NotDetec (16 Sep 2021 21:00)      RADIOLOGY & ADDITIONAL TESTS:  Imaging from Last 24 Hours:    Electrocardiogram/QTc Interval:    COORDINATION OF CARE:  Care Discussed with Consultants/Other Providers:   Medicine Progress Note    Patient is a 66y old  Female who presents with a chief complaint of Hypertensive urgency (17 Sep 2021 12:03)      SUBJECTIVE / OVERNIGHT EVENTS: no current concerns, reports that she is unable to afford diabetes medications    ADDITIONAL REVIEW OF SYSTEMS:    MEDICATIONS  (STANDING):  amLODIPine   Tablet 10 milliGRAM(s) Oral every 24 hours  dextrose 40% Gel 15 Gram(s) Oral once  dextrose 5%. 1000 milliLiter(s) (50 mL/Hr) IV Continuous <Continuous>  dextrose 5%. 1000 milliLiter(s) (100 mL/Hr) IV Continuous <Continuous>  dextrose 50% Injectable 25 Gram(s) IV Push once  dextrose 50% Injectable 12.5 Gram(s) IV Push once  dextrose 50% Injectable 25 Gram(s) IV Push once  glucagon  Injectable 1 milliGRAM(s) IntraMuscular once  heparin   Injectable 5000 Unit(s) SubCutaneous every 8 hours  influenza   Vaccine 0.5 milliLiter(s) IntraMuscular once  insulin glargine Injectable (LANTUS) 12 Unit(s) SubCutaneous at bedtime  insulin lispro (ADMELOG) corrective regimen sliding scale   SubCutaneous three times a day before meals  insulin lispro (ADMELOG) corrective regimen sliding scale   SubCutaneous at bedtime  insulin lispro Injectable (ADMELOG) 5 Unit(s) SubCutaneous three times a day before meals  labetalol 100 milliGRAM(s) Oral every 12 hours    MEDICATIONS  (PRN):    CAPILLARY BLOOD GLUCOSE      POCT Blood Glucose.: 209 mg/dL (17 Sep 2021 11:56)  POCT Blood Glucose.: 260 mg/dL (17 Sep 2021 07:44)  POCT Blood Glucose.: 248 mg/dL (17 Sep 2021 06:39)  POCT Blood Glucose.: 384 mg/dL (17 Sep 2021 02:33)    I&O's Summary      PHYSICAL EXAM:  Vital Signs Last 24 Hrs  T(C): 36.6 (17 Sep 2021 10:10), Max: 37 (17 Sep 2021 01:42)  T(F): 97.9 (17 Sep 2021 10:10), Max: 98.6 (17 Sep 2021 01:42)  HR: 78 (17 Sep 2021 10:10) (78 - 99)  BP: 154/97 (17 Sep 2021 10:10) (154/97 - 224/114)  BP(mean): --  RR: 18 (17 Sep 2021 10:10) (15 - 20)  SpO2: 100% (17 Sep 2021 10:10) (99% - 100%)  CONSTITUTIONAL: NAD, well-developed, well-groomed  ENMT: Moist oral mucosa, no pharyngeal injection or exudates; normal dentition  RESPIRATORY: Normal respiratory effort; lungs are clear to auscultation bilaterally  CARDIOVASCULAR: Regular rate and rhythm, normal S1 and S2, no murmur/rub/gallop  ABDOMEN: Nontender to palpation, normoactive bowel sounds, no rebound/guarding; No hepatosplenomegaly  PSYCH: A+O to person, place, and time; affect appropriate  NEUROLOGY: CN 2-12 are intact and symmetric; no gross sensory deficits   SKIN: No rashes; no palpable lesions    LABS:                        11.0   7.62  )-----------( 249      ( 17 Sep 2021 06:27 )             32.3     09-17    139  |  103  |  28<H>  ----------------------------<  266<H>  4.0   |  25  |  1.84<H>    Ca    9.5      17 Sep 2021 06:27  Phos  2.9     09-17  Mg     2.20     09-17    TPro  7.9  /  Alb  4.6  /  TBili  0.2  /  DBili  x   /  AST  30  /  ALT  23  /  AlkPhos  78  09-16          Urinalysis Basic - ( 16 Sep 2021 22:31 )    Color: Light Yellow / Appearance: Clear / S.008 / pH: x  Gluc: x / Ketone: Negative  / Bili: Negative / Urobili: <2 mg/dL   Blood: x / Protein: 300 mg/dL / Nitrite: Negative   Leuk Esterase: Negative / RBC: 3 /HPF / WBC 2 /HPF   Sq Epi: x / Non Sq Epi: 2 /HPF / Bacteria: Few        COVID-19 PCR: NotDetec (16 Sep 2021 21:00)      RADIOLOGY & ADDITIONAL TESTS:  Imaging from Last 24 Hours:    Electrocardiogram/QTc Interval:    COORDINATION OF CARE:  Care Discussed with Consultants/Other Providers:

## 2021-09-17 NOTE — H&P ADULT - HISTORY OF PRESENT ILLNESS
67 y/o F with hx of HTN, diabetes type 2 presents with elevated blood pressure. Patient states she noted her blood pressure was high two days ago [SBP >200]. She followed up with PMD who advised her to go to ED for further eval. She states she went to [facility], where she was started on amlodipine, losartan and januvia. She then followed up the PMD again who noted her blood pressure was high and advised to go back to the hospital. Patient states she is normally non complaint with her medications and forgets to take it. She states she has no hx of CKD as far she knows. Also endorses b/l LE edema worse in LLE.  Denies fever, chills, cough, falls, dizziness, chest pain, abdominal pain, nausea, vomiting, melena, hematochezia, or dysuria.

## 2021-09-17 NOTE — H&P ADULT - PROBLEM SELECTOR PLAN 6
hep sc for VTE px 1.  Name of PCP: Dr. Geni Fairbanks  2.  PCP Contacted on Admission: [ ] Y    [ ] N    3.  PCP contacted at Discharge: [ ] Y    [ ] N    [ ] N/A  4.  Post-Discharge Appointment Date and Location:  5.  Summary of Handoff given to PCP:

## 2021-09-17 NOTE — H&P ADULT - PROBLEM SELECTOR PLAN 1
Monitor BP  s/p labetalol 10mg IV and 5mg labetalol IV  avoid rapid correction  Hold losartan now given IVÁN  cont amlodipine Monitor BP  s/p labetalol 10mg IV and 5mg labetalol IV  avoid rapid correction  Hold losartan now given IVÁN  cont amlodipine  start labetalol 100mg BID and up-titrate as tolerated  Echo ordered Monitor BP  s/p labetalol 10mg IV and 5mg labetalol IV  avoid rapid correction  Hold losartan now given IVÁN  cont amlodipine  start labetalol 100mg BID and up-titrate as tolerated  Echo ordered  EKG: NSR 79 TWi in V4-V6, Flat T in AVR.

## 2021-09-17 NOTE — H&P ADULT - PROBLEM SELECTOR PLAN 3
A1C: 12.5  Blood glucose: 384  patient is on Januvia  Hold oral hypoglycemics  s/p 4 units SC Admelog   Monitor FS  start A1C: 12.5  Blood glucose: 384  patient is on Januvia  Hold oral hypoglycemics  s/p 4 units SC Admelog    Monitor FS  start lantus 4 units QHS   start admelog 3 units with meals  Endo consulted EKG: NSR 79 TWi in V4-V6, Flat T in AVR.  Patient is chest pain free and never had chest pain  echo ordered  trop: 27 -->28

## 2021-09-17 NOTE — CONSULT NOTE ADULT - ASSESSMENT
66 year old woman with PMH HTN, HLD, DM2, presents with elevated blood pressure, also with hyperglycemia in setting of uncontrolled DM2. HbA1c 12.2%. High risk patient with high level decision making, at high risk of worsening microvascular and macrovascular complications.    1. Uncontrolled type 2 diabetes mellitus with hyperglycemia:  - HbA1c 12.2%  - add Lantus 12 units qhs  - adjust Admelog to 5 units before meals  - low correction scale qac and qhs  - consistent carb diet  - check FS qac and qhs  - RD consult  - will follow  - for discharge: appears she does not have prescription drug coverage, thus will most likely need to be discharged on Humulin/Novolin 70/30 via syringes and vials, doses TBD. She requires insulin teaching prior to dc. Follow up TBD.    2. Essential hypertension:  - remains hypertensive, goal BP < 130/80  - on Norvasc and Labetalol, defer management to primary team    3. Hyperlipidemia:  - , severely elevated  - should be treated with high intensity statin such as Lipitor 40 mg qhs as long as there is no contraindication    Quincy Morgan MD   Pager # [phone]  On evenings and weekends, please call the office at [phone] or page endocrine fellow on call. Please note that this patient may be followed by different provider tomorrow. If no answer, contact the office.  66 year old woman with PMH HTN, HLD, DM2, presents with elevated blood pressure, also with hyperglycemia in setting of uncontrolled DM2. HbA1c 12.2%. High risk patient with high level decision making, at high risk of worsening microvascular and macrovascular complications.    1. Uncontrolled type 2 diabetes mellitus with hyperglycemia:  - HbA1c 12.2%  - add Lantus 12 units qhs  - adjust Admelog to 5 units before meals  - low correction scale qac and qhs  - consistent carb diet  - check FS qac and qhs  - RD consult  - will follow  - for discharge: our team looked into her case, she does not have medication coverage. Thus, she will be discharged on Humulin/Novolin 70/30 via syringes and vials, doses TBD. She requires insulin teaching prior to dc. Follow up TBD.    2. Essential hypertension:  - remains hypertensive, goal BP < 130/80  - on Norvasc and Labetalol, defer management to primary team    3. Hyperlipidemia:  - , severely elevated  - should be treated with high intensity statin such as Lipitor 40 mg qhs as long as there is no contraindication    Quincy Morgan MD   Pager # [phone]  On evenings and weekends, please call the office at [phone] or page endocrine fellow on call. Please note that this patient may be followed by different provider tomorrow. If no answer, contact the office.  66 year old woman with PMH HTN, HLD, DM2, presents with elevated blood pressure, also with hyperglycemia in setting of uncontrolled DM2. HbA1c 12.2%. High risk patient with high level decision making, at high risk of worsening microvascular and macrovascular complications.    1. Uncontrolled type 2 diabetes mellitus with hyperglycemia:  - HbA1c 12.2%  - add Lantus 12 units qhs  - adjust Admelog to 5 units before meals  - low correction scale qac and qhs  - consistent carb diet  - check FS qac and qhs  - RD consult  - will follow  - for discharge: our team looked into her case, she does not have medication coverage. Thus, she will be discharged on Humulin/Novolin 70/30 via syringes and vials, doses TBD. She requires insulin teaching prior to dc. Follow up TBD.    ADDENDUM: Our pharmacist assessed patient, appears patient cannot see well - thus insulin vials may not be an appropriate option. May need to dc on Relion 70/30 pens which are available from [employer] or [facility] (ie: low dose sulfonyurea or Prandin before meals + Actos, but this regimen is not ideal given CKD stage 4).    2. Essential hypertension:  - remains hypertensive, goal BP < 130/80  - on Norvasc and Labetalol, defer management to primary team    3. Hyperlipidemia:  - , severely elevated  - should be treated with high intensity statin such as Lipitor 40 mg qhs as long as there is no contraindication    Quincy Morgan MD   Pager # [phone]  On evenings and weekends, please call the office at [phone] or page endocrine fellow on call. Please note that this patient may be followed by different provider tomorrow. If no answer, contact the office.

## 2021-09-17 NOTE — H&P ADULT - PROBLEM SELECTOR PLAN 5
hep sc for VTE px 1.  Name of PCP: Dr. Geni Fairbanks  2.  PCP Contacted on Admission: [ ] Y    [ ] N    3.  PCP contacted at Discharge: [ ] Y    [ ] N    [ ] N/A  4.  Post-Discharge Appointment Date and Location:  5.  Summary of Handoff given to PCP: LE dopplers ordered r/o DVT

## 2021-09-17 NOTE — PHARMACOTHERAPY INTERVENTION NOTE - COMMENTS
Called patient's pharmacy - confirmed she does not have prescription insurance. Pt instructed on 70/30 mixed insulin, mixed peaks and risks of hypoglycemia. Pt instructed on need to mix insulin vial (gently roll 10x) - patient states she cannot see the syringe due to cataracts. Called Endocrine MD and plan is to try Novolin 70/30 Pens (available at [facility] only). Attempted to teach patient how to use mixed insulin pens - she still had a hard time properly counting the clicks and kept going past the practice number "10-" patient needs someone either to administer insulin or watch what dose she is giving herself. Patient lives with her brother but he works a lot and isn't always around to help her. I asked the patient if I could call him but she said he was working. Spoke with the patient's RN to continue practicing with her and going over how to count clicks on the insulin pen. Pt educated on A1c, 70/30 insulin pen administration, hypoglycemia and treatment, healthy plate, and when to check BG, pt needs continued reinforcement. Currently she cannot safely give insulin to herself on her own.

## 2021-09-18 LAB
ANION GAP SERPL CALC-SCNC: 14 MMOL/L — SIGNIFICANT CHANGE UP (ref 7–14)
BUN SERPL-MCNC: 35 MG/DL — HIGH (ref 7–23)
CALCIUM SERPL-MCNC: 9.1 MG/DL — SIGNIFICANT CHANGE UP (ref 8.4–10.5)
CHLORIDE SERPL-SCNC: 101 MMOL/L — SIGNIFICANT CHANGE UP (ref 98–107)
CHLORIDE UR-SCNC: 45 MMOL/L — SIGNIFICANT CHANGE UP
CO2 SERPL-SCNC: 20 MMOL/L — LOW (ref 22–31)
COVID-19 SPIKE DOMAIN AB INTERP: POSITIVE
COVID-19 SPIKE DOMAIN ANTIBODY RESULT: >250 U/ML — HIGH
CREAT SERPL-MCNC: 1.99 MG/DL — HIGH (ref 0.5–1.3)
CULTURE RESULTS: SIGNIFICANT CHANGE UP
GLUCOSE SERPL-MCNC: 274 MG/DL — HIGH (ref 70–99)
HCT VFR BLD CALC: 30.5 % — LOW (ref 34.5–45)
HCV AB S/CO SERPL IA: 0.14 S/CO — SIGNIFICANT CHANGE UP (ref 0–0.99)
HCV AB SERPL-IMP: SIGNIFICANT CHANGE UP
HGB BLD-MCNC: 10.3 G/DL — LOW (ref 11.5–15.5)
MAGNESIUM SERPL-MCNC: 2.1 MG/DL — SIGNIFICANT CHANGE UP (ref 1.6–2.6)
MCHC RBC-ENTMCNC: 29.8 PG — SIGNIFICANT CHANGE UP (ref 27–34)
MCHC RBC-ENTMCNC: 33.8 GM/DL — SIGNIFICANT CHANGE UP (ref 32–36)
MCV RBC AUTO: 88.2 FL — SIGNIFICANT CHANGE UP (ref 80–100)
NRBC # BLD: 0 /100 WBCS — SIGNIFICANT CHANGE UP
NRBC # FLD: 0 K/UL — SIGNIFICANT CHANGE UP
PHOSPHATE SERPL-MCNC: 4.3 MG/DL — SIGNIFICANT CHANGE UP (ref 2.5–4.5)
PLATELET # BLD AUTO: 260 K/UL — SIGNIFICANT CHANGE UP (ref 150–400)
POTASSIUM SERPL-MCNC: 3.9 MMOL/L — SIGNIFICANT CHANGE UP (ref 3.5–5.3)
POTASSIUM SERPL-SCNC: 3.9 MMOL/L — SIGNIFICANT CHANGE UP (ref 3.5–5.3)
POTASSIUM UR-SCNC: 42.5 MMOL/L — SIGNIFICANT CHANGE UP
RBC # BLD: 3.46 M/UL — LOW (ref 3.8–5.2)
RBC # FLD: 12.3 % — SIGNIFICANT CHANGE UP (ref 10.3–14.5)
SARS-COV-2 IGG+IGM SERPL QL IA: >250 U/ML — HIGH
SARS-COV-2 IGG+IGM SERPL QL IA: POSITIVE
SODIUM SERPL-SCNC: 135 MMOL/L — SIGNIFICANT CHANGE UP (ref 135–145)
SPECIMEN SOURCE: SIGNIFICANT CHANGE UP
WBC # BLD: 7.43 K/UL — SIGNIFICANT CHANGE UP (ref 3.8–10.5)
WBC # FLD AUTO: 7.43 K/UL — SIGNIFICANT CHANGE UP (ref 3.8–10.5)

## 2021-09-18 PROCEDURE — 99232 SBSQ HOSP IP/OBS MODERATE 35: CPT

## 2021-09-18 PROCEDURE — 93970 EXTREMITY STUDY: CPT | Mod: 26

## 2021-09-18 PROCEDURE — 99233 SBSQ HOSP IP/OBS HIGH 50: CPT

## 2021-09-18 RX ORDER — INSULIN LISPRO 100/ML
7 VIAL (ML) SUBCUTANEOUS
Refills: 0 | Status: DISCONTINUED | OUTPATIENT
Start: 2021-09-18 | End: 2021-09-20

## 2021-09-18 RX ORDER — INSULIN GLARGINE 100 [IU]/ML
15 INJECTION, SOLUTION SUBCUTANEOUS AT BEDTIME
Refills: 0 | Status: DISCONTINUED | OUTPATIENT
Start: 2021-09-18 | End: 2021-09-19

## 2021-09-18 RX ADMIN — INSULIN GLARGINE 15 UNIT(S): 100 INJECTION, SOLUTION SUBCUTANEOUS at 21:06

## 2021-09-18 RX ADMIN — HEPARIN SODIUM 5000 UNIT(S): 5000 INJECTION INTRAVENOUS; SUBCUTANEOUS at 05:46

## 2021-09-18 RX ADMIN — ATORVASTATIN CALCIUM 40 MILLIGRAM(S): 80 TABLET, FILM COATED ORAL at 21:07

## 2021-09-18 RX ADMIN — Medication 7 UNIT(S): at 17:01

## 2021-09-18 RX ADMIN — Medication 1: at 16:54

## 2021-09-18 RX ADMIN — HEPARIN SODIUM 5000 UNIT(S): 5000 INJECTION INTRAVENOUS; SUBCUTANEOUS at 12:56

## 2021-09-18 RX ADMIN — HEPARIN SODIUM 5000 UNIT(S): 5000 INJECTION INTRAVENOUS; SUBCUTANEOUS at 21:07

## 2021-09-18 RX ADMIN — Medication 3: at 12:17

## 2021-09-18 RX ADMIN — Medication 5 UNIT(S): at 08:01

## 2021-09-18 RX ADMIN — Medication 5 UNIT(S): at 12:17

## 2021-09-18 RX ADMIN — Medication 100 MILLIGRAM(S): at 17:01

## 2021-09-18 RX ADMIN — AMLODIPINE BESYLATE 10 MILLIGRAM(S): 2.5 TABLET ORAL at 12:56

## 2021-09-18 RX ADMIN — Medication 3: at 08:01

## 2021-09-18 RX ADMIN — Medication 100 MILLIGRAM(S): at 05:47

## 2021-09-18 NOTE — PROGRESS NOTE ADULT - SUBJECTIVE AND OBJECTIVE BOX
Chief Complaint: DM 2 with hyperglycemia     History: Patient seen at bedside. Reports she is eating meals, denies n/v, denies s/s of hypoglycemia  Endorses learning insulin PEN with pharmacist yesterday. Reports she understands how to use insulin pen, but still feels scared to inject herself. Agrees to practice self-injection with RNs  Requesting Glucerna supplement  Most recent  mg/dl     MEDICATIONS  (STANDING):  amLODIPine   Tablet 10 milliGRAM(s) Oral every 24 hours  atorvastatin 40 milliGRAM(s) Oral at bedtime  dextrose 40% Gel 15 Gram(s) Oral once  dextrose 5%. 1000 milliLiter(s) (50 mL/Hr) IV Continuous <Continuous>  dextrose 5%. 1000 milliLiter(s) (100 mL/Hr) IV Continuous <Continuous>  dextrose 50% Injectable 25 Gram(s) IV Push once  dextrose 50% Injectable 12.5 Gram(s) IV Push once  dextrose 50% Injectable 25 Gram(s) IV Push once  glucagon  Injectable 1 milliGRAM(s) IntraMuscular once  heparin   Injectable 5000 Unit(s) SubCutaneous every 8 hours  influenza   Vaccine 0.5 milliLiter(s) IntraMuscular once  insulin glargine Injectable (LANTUS) 12 Unit(s) SubCutaneous at bedtime  insulin lispro (ADMELOG) corrective regimen sliding scale   SubCutaneous three times a day before meals  insulin lispro (ADMELOG) corrective regimen sliding scale   SubCutaneous at bedtime  insulin lispro Injectable (ADMELOG) 5 Unit(s) SubCutaneous three times a day before meals  labetalol 100 milliGRAM(s) Oral every 12 hours    No Known Allergies    Review of Systems:  Cardiovascular: No chest pain  Respiratory: No SOB  GI: No nausea, vomiting  Endocrine: no hypoglycemia     PHYSICAL EXAM:  VITALS: T(C): 36.8 (09-18-21 @ 12:27)  T(F): 98.2 (09-18-21 @ 12:27), Max: 98.2 (09-18-21 @ 12:27)  HR: 76 (09-18-21 @ 12:27) (68 - 76)  BP: 135/81 (09-18-21 @ 12:27) (130/75 - 168/84)  RR:  (18 - 18)  SpO2:  (100% - 100%)  Wt(kg): --  GENERAL: NAD  EYES: No proptosis, no lid lag, anicteric  HEENT:  Atraumatic, Normocephalic, moist mucous membranes  RESPIRATORY: unlabored respirations   PSYCH: Alert and oriented x 3, normal affect, normal mood    CAPILLARY BLOOD GLUCOSE    POCT Blood Glucose.: 271 mg/dL (18 Sep 2021 11:44)  POCT Blood Glucose.: 255 mg/dL (18 Sep 2021 07:41)  POCT Blood Glucose.: 267 mg/dL (17 Sep 2021 20:51)  POCT Blood Glucose.: 186 mg/dL (17 Sep 2021 16:54)      09-18    135  |  101  |  35<H>  ----------------------------<  274<H>  3.9   |  20<L>  |  1.99<H>    EGFR if : 30<L>  EGFR if non : 26<L>    Ca    9.1      09-18  Mg     2.10     09-18  Phos  4.3     09-18    TPro  7.9  /  Alb  4.6  /  TBili  0.2  /  DBili  x   /  AST  30  /  ALT  23  /  AlkPhos  78  09-16      Thyroid Function Tests:  09-17 @ 06:27 TSH 1.48 FreeT4 -- T3 -- Anti TPO -- Anti Thyroglobulin Ab -- TSI --      A1C with Estimated Average Glucose Result: 12.4 % (09-17-21 @ 06:27)  A1C with Estimated Average Glucose Result: 12.2 % (09-16-21 @ 21:05)    Diet, Regular:   Consistent Carbohydrate Evening Snack (CSTCHOSN)  DASH/TLC Sodium & Cholesterol Restricted (DASH) (09-17-21 @ 05:01)

## 2021-09-18 NOTE — PROGRESS NOTE ADULT - SUBJECTIVE AND OBJECTIVE BOX
Medicine Progress Note    Patient is a 66y old  Female who presents with a chief complaint of Hypertensive urgency (18 Sep 2021 15:16)      SUBJECTIVE / OVERNIGHT EVENTS: no current concerns, BP improved from admission    ADDITIONAL REVIEW OF SYSTEMS:    MEDICATIONS  (STANDING):  amLODIPine   Tablet 10 milliGRAM(s) Oral every 24 hours  atorvastatin 40 milliGRAM(s) Oral at bedtime  dextrose 40% Gel 15 Gram(s) Oral once  dextrose 5%. 1000 milliLiter(s) (50 mL/Hr) IV Continuous <Continuous>  dextrose 5%. 1000 milliLiter(s) (100 mL/Hr) IV Continuous <Continuous>  dextrose 50% Injectable 25 Gram(s) IV Push once  dextrose 50% Injectable 12.5 Gram(s) IV Push once  dextrose 50% Injectable 25 Gram(s) IV Push once  glucagon  Injectable 1 milliGRAM(s) IntraMuscular once  heparin   Injectable 5000 Unit(s) SubCutaneous every 8 hours  influenza   Vaccine 0.5 milliLiter(s) IntraMuscular once  insulin glargine Injectable (LANTUS) 15 Unit(s) SubCutaneous at bedtime  insulin lispro (ADMELOG) corrective regimen sliding scale   SubCutaneous three times a day before meals  insulin lispro (ADMELOG) corrective regimen sliding scale   SubCutaneous at bedtime  insulin lispro Injectable (ADMELOG) 7 Unit(s) SubCutaneous three times a day before meals  labetalol 100 milliGRAM(s) Oral every 12 hours    MEDICATIONS  (PRN):    CAPILLARY BLOOD GLUCOSE      POCT Blood Glucose.: 194 mg/dL (18 Sep 2021 16:29)  POCT Blood Glucose.: 271 mg/dL (18 Sep 2021 11:44)  POCT Blood Glucose.: 255 mg/dL (18 Sep 2021 07:41)  POCT Blood Glucose.: 267 mg/dL (17 Sep 2021 20:51)    I&O's Summary      PHYSICAL EXAM:  Vital Signs Last 24 Hrs  T(C): 36.8 (18 Sep 2021 16:27), Max: 36.8 (18 Sep 2021 12:27)  T(F): 98.2 (18 Sep 2021 16:27), Max: 98.2 (18 Sep 2021 12:27)  HR: 79 (18 Sep 2021 16:27) (68 - 79)  BP: 138/85 (18 Sep 2021 16:27) (130/75 - 168/84)  BP(mean): --  RR: 18 (18 Sep 2021 16:27) (18 - 18)  SpO2: 100% (18 Sep 2021 16:27) (100% - 100%)  CONSTITUTIONAL: NAD, well-developed, well-groomed  ENMT: Moist oral mucosa, no pharyngeal injection or exudates; normal dentition  RESPIRATORY: Normal respiratory effort; lungs are clear to auscultation bilaterally  CARDIOVASCULAR: Regular rate and rhythm, normal S1 and S2, no murmur/rub/gallop; No lower extremity edema; Peripheral pulses are 2+ bilaterally  ABDOMEN: Nontender to palpation, normoactive bowel sounds, no rebound/guarding; No hepatosplenomegaly  PSYCH: A+O to person, place, and time; affect appropriate  NEUROLOGY: CN 2-12 are intact and symmetric; no gross sensory deficits   SKIN: No rashes; no palpable lesions    LABS:                        10.3   7.43  )-----------( 260      ( 18 Sep 2021 07:28 )             30.5     09-18    135  |  101  |  35<H>  ----------------------------<  274<H>  3.9   |  20<L>  |  1.99<H>    Ca    9.1      18 Sep 2021 07:28  Phos  4.3     -18  Mg     2.10     -18    TPro  7.9  /  Alb  4.6  /  TBili  0.2  /  DBili  x   /  AST  30  /  ALT  23  /  AlkPhos  78  09-16          Urinalysis Basic - ( 16 Sep 2021 22:31 )    Color: Light Yellow / Appearance: Clear / S.008 / pH: x  Gluc: x / Ketone: Negative  / Bili: Negative / Urobili: <2 mg/dL   Blood: x / Protein: 300 mg/dL / Nitrite: Negative   Leuk Esterase: Negative / RBC: 3 /HPF / WBC 2 /HPF   Sq Epi: x / Non Sq Epi: 2 /HPF / Bacteria: Few        Culture - Urine (collected 16 Sep 2021 21:30)  Source: Clean Catch Clean Catch (Midstream)  Final Report (18 Sep 2021 15:40):    Culture grew 3 or more types of organisms which indicate    collection contamination; consider recollection only if clinically    indicated.      COVID-19 PCR: NotDetec (16 Sep 2021 21:00)      RADIOLOGY & ADDITIONAL TESTS:  Imaging from Last 24 Hours:    Electrocardiogram/QTc Interval:    COORDINATION OF CARE:  Care Discussed with Consultants/Other Providers:

## 2021-09-18 NOTE — PROGRESS NOTE ADULT - PROBLEM SELECTOR PLAN 4
A1C: 12.5  Blood glucose: 384  as o/p patient is on Januvia, Hold oral hypoglycemics  case d/w dr. Morgan, will initiate insulin, pending insurance coverage may need 70/30.  diabetic and nutrition consult  -increase prandial to 7U, follow fingersticks

## 2021-09-19 LAB
ANION GAP SERPL CALC-SCNC: 13 MMOL/L — SIGNIFICANT CHANGE UP (ref 7–14)
BUN SERPL-MCNC: 38 MG/DL — HIGH (ref 7–23)
CALCIUM SERPL-MCNC: 9.3 MG/DL — SIGNIFICANT CHANGE UP (ref 8.4–10.5)
CHLORIDE SERPL-SCNC: 102 MMOL/L — SIGNIFICANT CHANGE UP (ref 98–107)
CO2 SERPL-SCNC: 22 MMOL/L — SIGNIFICANT CHANGE UP (ref 22–31)
CREAT SERPL-MCNC: 1.98 MG/DL — HIGH (ref 0.5–1.3)
GLUCOSE SERPL-MCNC: 203 MG/DL — HIGH (ref 70–99)
HCT VFR BLD CALC: 28.5 % — LOW (ref 34.5–45)
HGB BLD-MCNC: 9.3 G/DL — LOW (ref 11.5–15.5)
MAGNESIUM SERPL-MCNC: 2.1 MG/DL — SIGNIFICANT CHANGE UP (ref 1.6–2.6)
MCHC RBC-ENTMCNC: 28.8 PG — SIGNIFICANT CHANGE UP (ref 27–34)
MCHC RBC-ENTMCNC: 32.6 GM/DL — SIGNIFICANT CHANGE UP (ref 32–36)
MCV RBC AUTO: 88.2 FL — SIGNIFICANT CHANGE UP (ref 80–100)
NRBC # BLD: 0 /100 WBCS — SIGNIFICANT CHANGE UP
NRBC # FLD: 0 K/UL — SIGNIFICANT CHANGE UP
PHOSPHATE SERPL-MCNC: 4.3 MG/DL — SIGNIFICANT CHANGE UP (ref 2.5–4.5)
PLATELET # BLD AUTO: 243 K/UL — SIGNIFICANT CHANGE UP (ref 150–400)
POTASSIUM SERPL-MCNC: 3.9 MMOL/L — SIGNIFICANT CHANGE UP (ref 3.5–5.3)
POTASSIUM SERPL-SCNC: 3.9 MMOL/L — SIGNIFICANT CHANGE UP (ref 3.5–5.3)
RBC # BLD: 3.23 M/UL — LOW (ref 3.8–5.2)
RBC # FLD: 12.2 % — SIGNIFICANT CHANGE UP (ref 10.3–14.5)
SODIUM SERPL-SCNC: 137 MMOL/L — SIGNIFICANT CHANGE UP (ref 135–145)
WBC # BLD: 7.32 K/UL — SIGNIFICANT CHANGE UP (ref 3.8–10.5)
WBC # FLD AUTO: 7.32 K/UL — SIGNIFICANT CHANGE UP (ref 3.8–10.5)

## 2021-09-19 PROCEDURE — 99232 SBSQ HOSP IP/OBS MODERATE 35: CPT

## 2021-09-19 PROCEDURE — 99233 SBSQ HOSP IP/OBS HIGH 50: CPT

## 2021-09-19 RX ORDER — INSULIN GLARGINE 100 [IU]/ML
17 INJECTION, SOLUTION SUBCUTANEOUS AT BEDTIME
Refills: 0 | Status: DISCONTINUED | OUTPATIENT
Start: 2021-09-19 | End: 2021-09-19

## 2021-09-19 RX ORDER — INSULIN GLARGINE 100 [IU]/ML
20 INJECTION, SOLUTION SUBCUTANEOUS AT BEDTIME
Refills: 0 | Status: DISCONTINUED | OUTPATIENT
Start: 2021-09-19 | End: 2021-09-22

## 2021-09-19 RX ADMIN — Medication 1: at 22:08

## 2021-09-19 RX ADMIN — Medication 7 UNIT(S): at 17:36

## 2021-09-19 RX ADMIN — AMLODIPINE BESYLATE 10 MILLIGRAM(S): 2.5 TABLET ORAL at 12:37

## 2021-09-19 RX ADMIN — HEPARIN SODIUM 5000 UNIT(S): 5000 INJECTION INTRAVENOUS; SUBCUTANEOUS at 05:11

## 2021-09-19 RX ADMIN — HEPARIN SODIUM 5000 UNIT(S): 5000 INJECTION INTRAVENOUS; SUBCUTANEOUS at 12:37

## 2021-09-19 RX ADMIN — HEPARIN SODIUM 5000 UNIT(S): 5000 INJECTION INTRAVENOUS; SUBCUTANEOUS at 22:07

## 2021-09-19 RX ADMIN — Medication 7 UNIT(S): at 08:12

## 2021-09-19 RX ADMIN — INSULIN GLARGINE 20 UNIT(S): 100 INJECTION, SOLUTION SUBCUTANEOUS at 22:06

## 2021-09-19 RX ADMIN — Medication 2: at 12:12

## 2021-09-19 RX ADMIN — Medication 2: at 08:02

## 2021-09-19 RX ADMIN — ATORVASTATIN CALCIUM 40 MILLIGRAM(S): 80 TABLET, FILM COATED ORAL at 22:07

## 2021-09-19 RX ADMIN — Medication 100 MILLIGRAM(S): at 17:23

## 2021-09-19 RX ADMIN — Medication 7 UNIT(S): at 12:12

## 2021-09-19 RX ADMIN — Medication 100 MILLIGRAM(S): at 05:12

## 2021-09-19 NOTE — PROGRESS NOTE ADULT - SUBJECTIVE AND OBJECTIVE BOX
Chief Complaint: DM 2    History: Patient seen at bedside. Reports she is eating meals, denies n/v, denies s/s of hypoglycemia     MEDICATIONS  (STANDING):  amLODIPine   Tablet 10 milliGRAM(s) Oral every 24 hours  atorvastatin 40 milliGRAM(s) Oral at bedtime  dextrose 40% Gel 15 Gram(s) Oral once  dextrose 5%. 1000 milliLiter(s) (50 mL/Hr) IV Continuous <Continuous>  dextrose 5%. 1000 milliLiter(s) (100 mL/Hr) IV Continuous <Continuous>  dextrose 50% Injectable 25 Gram(s) IV Push once  dextrose 50% Injectable 12.5 Gram(s) IV Push once  dextrose 50% Injectable 25 Gram(s) IV Push once  glucagon  Injectable 1 milliGRAM(s) IntraMuscular once  heparin   Injectable 5000 Unit(s) SubCutaneous every 8 hours  influenza   Vaccine 0.5 milliLiter(s) IntraMuscular once  insulin glargine Injectable (LANTUS) 17 Unit(s) SubCutaneous at bedtime  insulin lispro (ADMELOG) corrective regimen sliding scale   SubCutaneous three times a day before meals  insulin lispro (ADMELOG) corrective regimen sliding scale   SubCutaneous at bedtime  insulin lispro Injectable (ADMELOG) 7 Unit(s) SubCutaneous three times a day before meals  labetalol 100 milliGRAM(s) Oral every 12 hours    No Known Allergies    Review of Systems:  Cardiovascular: No chest pain  Respiratory: No SOB  GI: No nausea, vomiting  Endocrine: no hypoglycemia     PHYSICAL EXAM:  VITALS: T(C): 37.1 (09-19-21 @ 12:34)  T(F): 98.7 (09-19-21 @ 12:34), Max: 98.7 (09-19-21 @ 12:34)  HR: 82 (09-19-21 @ 12:34) (78 - 83)  BP: 150/67 (09-19-21 @ 12:34) (114/62 - 150/67)  RR:  (17 - 18)  SpO2:  (100% - 100%)  Wt(kg): --  GENERAL: NAD  EYES: No proptosis, no lid lag, anicteric  HEENT:  Atraumatic, Normocephalic, moist mucous membranes  RESPIRATORY: unlabored respirations   PSYCH: Alert and oriented x 3    CAPILLARY BLOOD GLUCOSE    POCT Blood Glucose.: 250 mg/dL (19 Sep 2021 11:31)  POCT Blood Glucose.: 205 mg/dL (19 Sep 2021 07:45)  POCT Blood Glucose.: 151 mg/dL (18 Sep 2021 20:48)  POCT Blood Glucose.: 194 mg/dL (18 Sep 2021 16:29)      09-19    137  |  102  |  38<H>  ----------------------------<  203<H>  3.9   |  22  |  1.98<H>    EGFR if : 30<L>  EGFR if non : 26<L>    Ca    9.3      09-19  Mg     2.10     09-19  Phos  4.3     09-19    TPro  7.9  /  Alb  4.6  /  TBili  0.2  /  DBili  x   /  AST  30  /  ALT  23  /  AlkPhos  78  09-16      Thyroid Function Tests:  09-17 @ 06:27 TSH 1.48 FreeT4 -- T3 -- Anti TPO -- Anti Thyroglobulin Ab -- TSI --      A1C with Estimated Average Glucose Result: 12.4 % (09-17-21 @ 06:27)  A1C with Estimated Average Glucose Result: 12.2 % (09-16-21 @ 21:05)    Diet, Regular:   Consistent Carbohydrate No Snacks (CSTCHO)  DASH/TLC Sodium & Cholesterol Restricted (DASH)  Supplement Feeding Modality:  Oral  Glucerna Shake Cans or Servings Per Day:  1       Frequency:  Daily (09-18-21 @ 15:30)

## 2021-09-19 NOTE — PROGRESS NOTE ADULT - SUBJECTIVE AND OBJECTIVE BOX
Medicine Progress Note    Patient is a 66y old  Female who presents with a chief complaint of Hypertensive urgency (18 Sep 2021 17:15)      SUBJECTIVE / OVERNIGHT EVENTS: no acute events overnight    ADDITIONAL REVIEW OF SYSTEMS:    MEDICATIONS  (STANDING):  amLODIPine   Tablet 10 milliGRAM(s) Oral every 24 hours  atorvastatin 40 milliGRAM(s) Oral at bedtime  dextrose 40% Gel 15 Gram(s) Oral once  dextrose 5%. 1000 milliLiter(s) (50 mL/Hr) IV Continuous <Continuous>  dextrose 5%. 1000 milliLiter(s) (100 mL/Hr) IV Continuous <Continuous>  dextrose 50% Injectable 25 Gram(s) IV Push once  dextrose 50% Injectable 12.5 Gram(s) IV Push once  dextrose 50% Injectable 25 Gram(s) IV Push once  glucagon  Injectable 1 milliGRAM(s) IntraMuscular once  heparin   Injectable 5000 Unit(s) SubCutaneous every 8 hours  influenza   Vaccine 0.5 milliLiter(s) IntraMuscular once  insulin glargine Injectable (LANTUS) 15 Unit(s) SubCutaneous at bedtime  insulin lispro (ADMELOG) corrective regimen sliding scale   SubCutaneous three times a day before meals  insulin lispro (ADMELOG) corrective regimen sliding scale   SubCutaneous at bedtime  insulin lispro Injectable (ADMELOG) 7 Unit(s) SubCutaneous three times a day before meals  labetalol 100 milliGRAM(s) Oral every 12 hours    MEDICATIONS  (PRN):    CAPILLARY BLOOD GLUCOSE      POCT Blood Glucose.: 250 mg/dL (19 Sep 2021 11:31)  POCT Blood Glucose.: 205 mg/dL (19 Sep 2021 07:45)  POCT Blood Glucose.: 151 mg/dL (18 Sep 2021 20:48)  POCT Blood Glucose.: 194 mg/dL (18 Sep 2021 16:29)    I&O's Summary      PHYSICAL EXAM:  Vital Signs Last 24 Hrs  T(C): 36.7 (19 Sep 2021 05:10), Max: 36.8 (18 Sep 2021 12:27)  T(F): 98 (19 Sep 2021 05:10), Max: 98.2 (18 Sep 2021 12:27)  HR: 78 (19 Sep 2021 05:10) (76 - 83)  BP: 139/74 (19 Sep 2021 05:10) (114/62 - 143/71)  BP(mean): --  RR: 18 (19 Sep 2021 05:10) (17 - 18)  SpO2: 100% (19 Sep 2021 05:10) (100% - 100%)  CONSTITUTIONAL: NAD, well-developed, well-groomed  ENMT: Moist oral mucosa, no pharyngeal injection or exudates; normal dentition  RESPIRATORY: Normal respiratory effort; lungs are clear to auscultation bilaterally  CARDIOVASCULAR: Regular rate and rhythm, normal S1 and S2, no murmur/rub/gallop; No lower extremity edema; Peripheral pulses are 2+ bilaterally  ABDOMEN: Nontender to palpation, normoactive bowel sounds, no rebound/guarding; No hepatosplenomegaly  PSYCH: A+O to person, place, and time; affect appropriate  NEUROLOGY: CN 2-12 are intact and symmetric; no gross sensory deficits   SKIN: No rashes; no palpable lesions    LABS:                        9.3    7.32  )-----------( 243      ( 19 Sep 2021 07:42 )             28.5     09-19    137  |  102  |  38<H>  ----------------------------<  203<H>  3.9   |  22  |  1.98<H>    Ca    9.3      19 Sep 2021 07:42  Phos  4.3     09-19  Mg     2.10     09-19                Culture - Urine (collected 16 Sep 2021 21:30)  Source: Clean Catch Clean Catch (Midstream)  Final Report (18 Sep 2021 15:40):    Culture grew 3 or more types of organisms which indicate    collection contamination; consider recollection only if clinically    indicated.      COVID-19 PCR: NotDetec (16 Sep 2021 21:00)      RADIOLOGY & ADDITIONAL TESTS:  Imaging from Last 24 Hours:    Electrocardiogram/QTc Interval:    COORDINATION OF CARE:  Care Discussed with Consultants/Other Providers:

## 2021-09-19 NOTE — PROGRESS NOTE ADULT - PROBLEM SELECTOR PLAN 6
1.  Name of PCP: Dr. Geni Fairbanks  2.  PCP Contacted on Admission: [ ] Y    [ ] N    3.  PCP contacted at Discharge: [ ] Y    [ ] N    [ ] N/A  4.  Post-Discharge Appointment Date and Location:  5.  Summary of Handoff given to PCP:

## 2021-09-19 NOTE — PROGRESS NOTE ADULT - PROBLEM SELECTOR PLAN 3
EKG: NSR 79 TWi in V4-V6, Flat T in AVR.  Patient is chest pain free and never had chest pain  echo ordered  trop: 27 -->28

## 2021-09-19 NOTE — PROGRESS NOTE ADULT - PROBLEM SELECTOR PLAN 4
A1C: 12.5  Blood glucose: 384  as o/p patient is on Januvia, Hold oral hypoglycemics  case d/w dr. Morgan, will initiate insulin, pending insurance coverage may need 70/30.  diabetic and nutrition consult  follow fingersticks, titrate insulin as needed  -diabetic and nutrition education A1C: 12.5  Blood glucose: 384  as o/p patient is on Januvia, Hold oral hypoglycemics  case d/w dr. Morgan, will initiate insulin, pending insurance coverage may need 70/30.  diabetic and nutrition consult  follow fingersticks, increased basal to 17u  -diabetic and nutrition education

## 2021-09-19 NOTE — PROGRESS NOTE ADULT - PROBLEM SELECTOR PLAN 2
-continue to Monitor Cr  -Cr remains stable, continue to monitor, will need o/p follow up
-continue to Monitor Cr  -as there is no previous Cr to compare; therefore, will treat as IVÁN  -f/u  urine lytes  check bladder scan
-continue to Monitor Cr  -Cr remains stable, 1.98 today, likely CKD  - will need o/p nephrology follow up

## 2021-09-19 NOTE — PROGRESS NOTE ADULT - PROBLEM SELECTOR PLAN 1
-etiology may be poor health literacy and adherence  -continue to Monitor BP  -cont amlodipine  -continue labetalol 100mg BID and up-titrate as tolerated  f/u Echo ordered
-etiology may be poor health literacy and adherence  -continue to Monitor BP  -cont amlodipine  -continue labetalol 100mg BID and up-titrate as tolerated  f/u Echo ordered  -BP improved today, , goal BP with DM <130/80
-etiology may be poor health literacy and adherence  -BP improved 134/74  -cont amlodipine and labetalol 100mg BID  -f/u Echo ordered  -goal BP with DM <130/80

## 2021-09-20 LAB
ANION GAP SERPL CALC-SCNC: 13 MMOL/L — SIGNIFICANT CHANGE UP (ref 7–14)
BUN SERPL-MCNC: 46 MG/DL — HIGH (ref 7–23)
CALCIUM SERPL-MCNC: 9.4 MG/DL — SIGNIFICANT CHANGE UP (ref 8.4–10.5)
CHLORIDE SERPL-SCNC: 100 MMOL/L — SIGNIFICANT CHANGE UP (ref 98–107)
CO2 SERPL-SCNC: 19 MMOL/L — LOW (ref 22–31)
CREAT SERPL-MCNC: 2.26 MG/DL — HIGH (ref 0.5–1.3)
GLUCOSE SERPL-MCNC: 225 MG/DL — HIGH (ref 70–99)
HCT VFR BLD CALC: 28.6 % — LOW (ref 34.5–45)
HGB BLD-MCNC: 9.3 G/DL — LOW (ref 11.5–15.5)
MAGNESIUM SERPL-MCNC: 2 MG/DL — SIGNIFICANT CHANGE UP (ref 1.6–2.6)
MCHC RBC-ENTMCNC: 28.8 PG — SIGNIFICANT CHANGE UP (ref 27–34)
MCHC RBC-ENTMCNC: 32.5 GM/DL — SIGNIFICANT CHANGE UP (ref 32–36)
MCV RBC AUTO: 88.5 FL — SIGNIFICANT CHANGE UP (ref 80–100)
NRBC # BLD: 0 /100 WBCS — SIGNIFICANT CHANGE UP
NRBC # FLD: 0 K/UL — SIGNIFICANT CHANGE UP
PHOSPHATE SERPL-MCNC: 4.8 MG/DL — HIGH (ref 2.5–4.5)
PLATELET # BLD AUTO: 249 K/UL — SIGNIFICANT CHANGE UP (ref 150–400)
POTASSIUM SERPL-MCNC: 4.1 MMOL/L — SIGNIFICANT CHANGE UP (ref 3.5–5.3)
POTASSIUM SERPL-SCNC: 4.1 MMOL/L — SIGNIFICANT CHANGE UP (ref 3.5–5.3)
RBC # BLD: 3.23 M/UL — LOW (ref 3.8–5.2)
RBC # FLD: 12.2 % — SIGNIFICANT CHANGE UP (ref 10.3–14.5)
SODIUM SERPL-SCNC: 132 MMOL/L — LOW (ref 135–145)
WBC # BLD: 7.34 K/UL — SIGNIFICANT CHANGE UP (ref 3.8–10.5)
WBC # FLD AUTO: 7.34 K/UL — SIGNIFICANT CHANGE UP (ref 3.8–10.5)

## 2021-09-20 PROCEDURE — 99232 SBSQ HOSP IP/OBS MODERATE 35: CPT

## 2021-09-20 RX ORDER — INSULIN LISPRO 100/ML
7 VIAL (ML) SUBCUTANEOUS
Refills: 0 | Status: DISCONTINUED | OUTPATIENT
Start: 2021-09-21 | End: 2021-09-22

## 2021-09-20 RX ORDER — PIOGLITAZONE HYDROCHLORIDE 15 MG/1
1 TABLET ORAL
Qty: 30 | Refills: 0
Start: 2021-09-20 | End: 2021-10-19

## 2021-09-20 RX ORDER — REPAGLINIDE 1 MG/1
1 TABLET ORAL
Qty: 90 | Refills: 0
Start: 2021-09-20 | End: 2021-10-19

## 2021-09-20 RX ORDER — INSULIN LISPRO 100/ML
6 VIAL (ML) SUBCUTANEOUS
Refills: 0 | Status: DISCONTINUED | OUTPATIENT
Start: 2021-09-21 | End: 2021-09-21

## 2021-09-20 RX ORDER — ISOPROPYL ALCOHOL, BENZOCAINE .7; .06 ML/ML; ML/ML
1 SWAB TOPICAL
Qty: 100 | Refills: 1
Start: 2021-09-20 | End: 2021-11-08

## 2021-09-20 RX ORDER — INSULIN LISPRO 100/ML
7 VIAL (ML) SUBCUTANEOUS
Refills: 0 | Status: DISCONTINUED | OUTPATIENT
Start: 2021-09-20 | End: 2021-09-22

## 2021-09-20 RX ADMIN — Medication 100 MILLIGRAM(S): at 05:46

## 2021-09-20 RX ADMIN — Medication 4: at 16:49

## 2021-09-20 RX ADMIN — ATORVASTATIN CALCIUM 40 MILLIGRAM(S): 80 TABLET, FILM COATED ORAL at 21:52

## 2021-09-20 RX ADMIN — Medication 7 UNIT(S): at 07:59

## 2021-09-20 RX ADMIN — HEPARIN SODIUM 5000 UNIT(S): 5000 INJECTION INTRAVENOUS; SUBCUTANEOUS at 13:23

## 2021-09-20 RX ADMIN — HEPARIN SODIUM 5000 UNIT(S): 5000 INJECTION INTRAVENOUS; SUBCUTANEOUS at 05:37

## 2021-09-20 RX ADMIN — Medication 2: at 07:59

## 2021-09-20 RX ADMIN — Medication 100 MILLIGRAM(S): at 17:15

## 2021-09-20 RX ADMIN — HEPARIN SODIUM 5000 UNIT(S): 5000 INJECTION INTRAVENOUS; SUBCUTANEOUS at 21:52

## 2021-09-20 RX ADMIN — INSULIN GLARGINE 20 UNIT(S): 100 INJECTION, SOLUTION SUBCUTANEOUS at 21:39

## 2021-09-20 RX ADMIN — Medication 7 UNIT(S): at 16:50

## 2021-09-20 RX ADMIN — AMLODIPINE BESYLATE 10 MILLIGRAM(S): 2.5 TABLET ORAL at 13:23

## 2021-09-20 NOTE — DISCHARGE NOTE NURSING/CASE MANAGEMENT/SOCIAL WORK - PATIENT PORTAL LINK FT
You can access the FollowMyHealth Patient Portal offered by [facility] by registering at the following website: [email or web address]. By joining [facility]’s FollowMyHealth portal, you will also be able to view your health information using other applications (apps) compatible with our system.

## 2021-09-20 NOTE — DIETITIAN INITIAL EVALUATION ADULT. - OTHER INFO
Pt 65 yo female with PMHx of HTN, DM presented with elevated blood pressure - per chart review.     At time fo visit, Pt awake, alert. Per Pt, her appetite not that well; no report of chewing or swallowing difficulty; no report of nausea, vomiting or diarrhea @ this time. +BM (9/19) per flow sheet. Per Pt, her height: ~64"; but Pt not sure about her current body weight or any weight changes PTA. Pt with weight gain PTA, but unable to quantify.     Of note, Pt's HbA1c level 12.4% (9/17). At home Pt does not follow therapeutic diet restrictions reported. Consistent Carbohydrate diet discussed with Pt including better food choices, foods to avoid; written materials on diet also provided. RDN answered concerns related to diet; Pt verbalized understanding. Will recommend Pt to follow up with appropriate RDN upon discharge for the purposes of long-term nutrition evaluation and diet education. RDN remains available, Pt made aware.

## 2021-09-20 NOTE — DIETITIAN INITIAL EVALUATION ADULT. - ADD RECOMMEND
1. Encourage & assist Pt with meals; Monitor PO diet tolerance; Honor food preferences;          2. Monitor labs, hydration status;        3. Will recommend Pt to follow up with appropriate RDN upon discharge for the purposes of long-term nutrition evaluation and diet education;

## 2021-09-20 NOTE — DIETITIAN INITIAL EVALUATION ADULT. - PERTINENT LABORATORY DATA
(9/20) H/H 9.3/28.6 L, Na 132 L, phosphorus 4.8 H, BUN 46 H, Creat 2.26 H, Glu 225 H;   (9/17) HbA1c 12.4% H, Cholesterol 386 H, Triglycerides 281 H;   (9/16) Albumin 4.6

## 2021-09-20 NOTE — DISCHARGE NOTE NURSING/CASE MANAGEMENT/SOCIAL WORK - NSSCCONTNUM_GEN_ALL_CORE
The EMR was down from [id number] on 6/12/17.    Dana Munson was responsible for completing the paper charting during this time period.     The following information was re-entered into the system by Linda Edgar.     All other intraoperative information will remain in the paper chart.   [phone]

## 2021-09-20 NOTE — PROGRESS NOTE ADULT - SUBJECTIVE AND OBJECTIVE BOX
Chief Complaint: f/u DM2    History:  Patient seen at bedside this morning, states that she is eating okay. Sometimes she eats more, sometimes less. States that she had dinner last night and breakfast this morning. No abdominal pain, nausea, vomiting.    She has been evaluated by our pharmacy liaison - due to cataracts, she cannot self inject insulin.     MEDICATIONS  (STANDING):  amLODIPine   Tablet 10 milliGRAM(s) Oral every 24 hours  atorvastatin 40 milliGRAM(s) Oral at bedtime  dextrose 40% Gel 15 Gram(s) Oral once  dextrose 5%. 1000 milliLiter(s) (50 mL/Hr) IV Continuous <Continuous>  dextrose 5%. 1000 milliLiter(s) (100 mL/Hr) IV Continuous <Continuous>  dextrose 50% Injectable 25 Gram(s) IV Push once  dextrose 50% Injectable 12.5 Gram(s) IV Push once  dextrose 50% Injectable 25 Gram(s) IV Push once  glucagon  Injectable 1 milliGRAM(s) IntraMuscular once  heparin   Injectable 5000 Unit(s) SubCutaneous every 8 hours  influenza   Vaccine 0.5 milliLiter(s) IntraMuscular once  insulin glargine Injectable (LANTUS) 20 Unit(s) SubCutaneous at bedtime  insulin lispro (ADMELOG) corrective regimen sliding scale   SubCutaneous three times a day before meals  insulin lispro (ADMELOG) corrective regimen sliding scale   SubCutaneous at bedtime  insulin lispro Injectable (ADMELOG) 7 Unit(s) SubCutaneous three times a day before meals  labetalol 100 milliGRAM(s) Oral every 12 hours    MEDICATIONS  (PRN):      Allergies  No Known Allergies    Review of Systems:  ALL OTHER SYSTEMS REVIEWED AND NEGATIVE    PHYSICAL EXAM:  VITALS: T(C): 36.4 (09-20-21 @ 13:19)  T(F): 97.6 (09-20-21 @ 13:19), Max: 98.4 (09-19-21 @ 16:30)  HR: 78 (09-20-21 @ 13:19) (71 - 88)  BP: 142/68 (09-20-21 @ 13:19) (130/73 - 154/78)  RR:  (17 - 18)  SpO2:  (100% - 100%)  Wt(kg): --  GENERAL: NAD, well-developed  EYES: No proptosis, anicteric  HEENT:  Atraumatic, Normocephalic  RESPIRATORY: + air movement bilaterally, no respiratory distress   PSYCH: Alert and oriented x 3, reactive affect     POCT Blood Glucose.: 78 mg/dL (09-20-21 @ 11:37) no Admelog  POCT Blood Glucose.: 217 mg/dL (09-20-21 @ 07:47) A 7, A 2  POCT Blood Glucose.: 274 mg/dL (09-19-21 @ 21:44) L 20, A 1  POCT Blood Glucose.: 136 mg/dL (09-19-21 @ 17:28) A 7  POCT Blood Glucose.: 250 mg/dL (09-19-21 @ 11:31) A 7, A 2  POCT Blood Glucose.: 205 mg/dL (09-19-21 @ 07:45) A 7, A 2  POCT Blood Glucose.: 151 mg/dL (09-18-21 @ 20:48)  POCT Blood Glucose.: 194 mg/dL (09-18-21 @ 16:29)  POCT Blood Glucose.: 271 mg/dL (09-18-21 @ 11:44)  POCT Blood Glucose.: 255 mg/dL (09-18-21 @ 07:41)  POCT Blood Glucose.: 267 mg/dL (09-17-21 @ 20:51)  POCT Blood Glucose.: 186 mg/dL (09-17-21 @ 16:54)      09-20    132<L>  |  100  |  46<H>  ----------------------------<  225<H>  4.1   |  19<L>  |  2.26<H>    EGFR if : 26<L>  EGFR if non : 22<L>    Ca    9.4      09-20  Mg     2.00     09-20  Phos  4.8     09-20            Thyroid Function Tests:  09-17 @ 06:27 TSH 1.48 FreeT4 -- T3 -- Anti TPO -- Anti Thyroglobulin Ab -- TSI --

## 2021-09-20 NOTE — PROGRESS NOTE ADULT - SUBJECTIVE AND OBJECTIVE BOX
[facility] Medicine  Pager [phone]    Patient is a 66y old  Female who presents with a chief complaint of Hypertensive urgency (19 Sep 2021 13:45)      INTERVAL HPI/OVERNIGHT EVENTS:    MEDICATIONS  (STANDING):  amLODIPine   Tablet 10 milliGRAM(s) Oral every 24 hours  atorvastatin 40 milliGRAM(s) Oral at bedtime  dextrose 40% Gel 15 Gram(s) Oral once  dextrose 5%. 1000 milliLiter(s) (50 mL/Hr) IV Continuous <Continuous>  dextrose 5%. 1000 milliLiter(s) (100 mL/Hr) IV Continuous <Continuous>  dextrose 50% Injectable 25 Gram(s) IV Push once  dextrose 50% Injectable 12.5 Gram(s) IV Push once  dextrose 50% Injectable 25 Gram(s) IV Push once  glucagon  Injectable 1 milliGRAM(s) IntraMuscular once  heparin   Injectable 5000 Unit(s) SubCutaneous every 8 hours  influenza   Vaccine 0.5 milliLiter(s) IntraMuscular once  insulin glargine Injectable (LANTUS) 20 Unit(s) SubCutaneous at bedtime  insulin lispro (ADMELOG) corrective regimen sliding scale   SubCutaneous three times a day before meals  insulin lispro (ADMELOG) corrective regimen sliding scale   SubCutaneous at bedtime  insulin lispro Injectable (ADMELOG) 7 Unit(s) SubCutaneous three times a day before meals  labetalol 100 milliGRAM(s) Oral every 12 hours    MEDICATIONS  (PRN):      Allergies    No Known Allergies    Intolerances        REVIEW OF SYSTEMS:  Please see interval HPI:    Vital Signs Last 24 Hrs  T(C): 36.4 (20 Sep 2021 13:19), Max: 36.9 (19 Sep 2021 16:30)  T(F): 97.6 (20 Sep 2021 13:19), Max: 98.4 (19 Sep 2021 16:30)  HR: 78 (20 Sep 2021 13:19) (71 - 88)  BP: 142/68 (20 Sep 2021 13:19) (130/73 - 154/78)  BP(mean): --  RR: 17 (20 Sep 2021 13:19) (17 - 18)  SpO2: 100% (20 Sep 2021 13:19) (100% - 100%)  I&O's Detail        PHYSICAL EXAM:  GENERAL:   HEAD:    EYES:   ENMT:   NECK:   NERVOUS SYSTEM:    CHEST/LUNG:   HEART:   ABDOMEN:   EXTREMITIES:    LYMPH:   SKIN:     LABS:                        9.3    7.34  )-----------( 249      ( 20 Sep 2021 07:50 )             28.6     20 Sep 2021 07:50    132    |  100    |  46     ----------------------------<  225    4.1     |  19     |  2.26     Ca    9.4        20 Sep 2021 07:50  Phos  4.8       20 Sep 2021 07:50  Mg     2.00      20 Sep 2021 07:50        CAPILLARY BLOOD GLUCOSE    POCT Blood Glucose.: 78 mg/dL (20 Sep 2021 11:37)  POCT Blood Glucose.: 217 mg/dL (20 Sep 2021 07:47)  POCT Blood Glucose.: 274 mg/dL (19 Sep 2021 21:44)  POCT Blood Glucose.: 136 mg/dL (19 Sep 2021 17:28)    BLOOD CULTURE  09-16 @ 21:30   Culture grew 3 or more types of organisms which indicate  collection contamination; consider recollection only if clinically  indicated.  --  --    RADIOLOGY & ADDITIONAL TESTS:    Imaging Personally Reviewed:  [ ] YES     Consultant(s) Notes Reviewed:      Care Discussed with Consultants/Other Providers: TONI Magallanes re: BP optimization, endocrine f/u, need to coordinate insulin regimen/otpt f/u [facility]  Pager [phone]    Patient is a 66y old  Female who presents with a chief complaint of Hypertensive urgency (19 Sep 2021 13:45)      INTERVAL HPI/OVERNIGHT EVENTS:  Reports blurry vision x 6 months, unclear if would be able to safely self administer insulin at home. Denies current headache/CP/SOB. Discussed importance of blood pressure control and glycemic control, risks of uncontrolled hypertension and diabetes, patient and  at bedside verbalized understanding, in agreement w/ plan to optimize regimen.     MEDICATIONS  (STANDING):  amLODIPine   Tablet 10 milliGRAM(s) Oral every 24 hours  atorvastatin 40 milliGRAM(s) Oral at bedtime  dextrose 40% Gel 15 Gram(s) Oral once  dextrose 5%. 1000 milliLiter(s) (50 mL/Hr) IV Continuous <Continuous>  dextrose 5%. 1000 milliLiter(s) (100 mL/Hr) IV Continuous <Continuous>  dextrose 50% Injectable 25 Gram(s) IV Push once  dextrose 50% Injectable 12.5 Gram(s) IV Push once  dextrose 50% Injectable 25 Gram(s) IV Push once  glucagon  Injectable 1 milliGRAM(s) IntraMuscular once  heparin   Injectable 5000 Unit(s) SubCutaneous every 8 hours  influenza   Vaccine 0.5 milliLiter(s) IntraMuscular once  insulin glargine Injectable (LANTUS) 20 Unit(s) SubCutaneous at bedtime  insulin lispro (ADMELOG) corrective regimen sliding scale   SubCutaneous three times a day before meals  insulin lispro (ADMELOG) corrective regimen sliding scale   SubCutaneous at bedtime  insulin lispro Injectable (ADMELOG) 7 Unit(s) SubCutaneous three times a day before meals  labetalol 100 milliGRAM(s) Oral every 12 hours    MEDICATIONS  (PRN):    Allergies  No Known Allergies    Intolerances    REVIEW OF SYSTEMS:  Please see interval HPI:    Vital Signs Last 24 Hrs  T(C): 36.4 (20 Sep 2021 13:19), Max: 36.9 (19 Sep 2021 16:30)  T(F): 97.6 (20 Sep 2021 13:19), Max: 98.4 (19 Sep 2021 16:30)  HR: 78 (20 Sep 2021 13:19) (71 - 88)  BP: 142/68 (20 Sep 2021 13:19) (130/73 - 154/78)  BP(mean): --  RR: 17 (20 Sep 2021 13:19) (17 - 18)  SpO2: 100% (20 Sep 2021 13:19) (100% - 100%)  I&O's Detail    PHYSICAL EXAM:  GENERAL: NAD, sitting on edge of bed, eating lunch  HEAD:  NC/AT  EYES: EOMI, clear sclera/conjunctiva  ENMT: MMM  NECK: supple  NERVOUS SYSTEM: moving all extremities, non-focal    CHEST/LUNG: CTAB, comfortable on RA, speaking in full sentences  HEART: S1S2 RRR  ABDOMEN: soft, non-tender  EXTREMITIES:  no c/c/e    LABS:                        9.3    7.34  )-----------( 249      ( 20 Sep 2021 07:50 )             28.6     20 Sep 2021 07:50    132    |  100    |  46     ----------------------------<  225    4.1     |  19     |  2.26     Ca    9.4        20 Sep 2021 07:50  Phos  4.8       20 Sep 2021 07:50  Mg     2.00      20 Sep 2021 07:50    CAPILLARY BLOOD GLUCOSE  POCT Blood Glucose.: 78 mg/dL (20 Sep 2021 11:37)  POCT Blood Glucose.: 217 mg/dL (20 Sep 2021 07:47)  POCT Blood Glucose.: 274 mg/dL (19 Sep 2021 21:44)  POCT Blood Glucose.: 136 mg/dL (19 Sep 2021 17:28)    URINE CULTURE  09-16 @ 21:30   Culture grew 3 or more types of organisms which indicate  collection contamination; consider recollection only if clinically  indicated.  --  --    RADIOLOGY & ADDITIONAL TESTS:    Imaging Personally Reviewed:  [ ] YES     Consultant(s) Notes Reviewed:  Endocrine    Care Discussed with Consultants/Other Providers: TONI Magallanes re: BP optimization, endocrine f/u, need to coordinate insulin regimen/otpt f/u

## 2021-09-21 LAB
ANION GAP SERPL CALC-SCNC: 14 MMOL/L — SIGNIFICANT CHANGE UP (ref 7–14)
BUN SERPL-MCNC: 43 MG/DL — HIGH (ref 7–23)
CALCIUM SERPL-MCNC: 9.5 MG/DL — SIGNIFICANT CHANGE UP (ref 8.4–10.5)
CHLORIDE SERPL-SCNC: 105 MMOL/L — SIGNIFICANT CHANGE UP (ref 98–107)
CO2 SERPL-SCNC: 19 MMOL/L — LOW (ref 22–31)
CREAT SERPL-MCNC: 1.84 MG/DL — HIGH (ref 0.5–1.3)
GLUCOSE SERPL-MCNC: 191 MG/DL — HIGH (ref 70–99)
HCT VFR BLD CALC: 29 % — LOW (ref 34.5–45)
HGB BLD-MCNC: 9.7 G/DL — LOW (ref 11.5–15.5)
MAGNESIUM SERPL-MCNC: 2 MG/DL — SIGNIFICANT CHANGE UP (ref 1.6–2.6)
MCHC RBC-ENTMCNC: 29.7 PG — SIGNIFICANT CHANGE UP (ref 27–34)
MCHC RBC-ENTMCNC: 33.4 GM/DL — SIGNIFICANT CHANGE UP (ref 32–36)
MCV RBC AUTO: 88.7 FL — SIGNIFICANT CHANGE UP (ref 80–100)
NRBC # BLD: 0 /100 WBCS — SIGNIFICANT CHANGE UP
NRBC # FLD: 0 K/UL — SIGNIFICANT CHANGE UP
PHOSPHATE SERPL-MCNC: 5.4 MG/DL — HIGH (ref 2.5–4.5)
PLATELET # BLD AUTO: 242 K/UL — SIGNIFICANT CHANGE UP (ref 150–400)
POTASSIUM SERPL-MCNC: 4.2 MMOL/L — SIGNIFICANT CHANGE UP (ref 3.5–5.3)
POTASSIUM SERPL-SCNC: 4.2 MMOL/L — SIGNIFICANT CHANGE UP (ref 3.5–5.3)
RBC # BLD: 3.27 M/UL — LOW (ref 3.8–5.2)
RBC # FLD: 12.2 % — SIGNIFICANT CHANGE UP (ref 10.3–14.5)
SODIUM SERPL-SCNC: 138 MMOL/L — SIGNIFICANT CHANGE UP (ref 135–145)
WBC # BLD: 6.73 K/UL — SIGNIFICANT CHANGE UP (ref 3.8–10.5)
WBC # FLD AUTO: 6.73 K/UL — SIGNIFICANT CHANGE UP (ref 3.8–10.5)

## 2021-09-21 PROCEDURE — 93306 TTE W/DOPPLER COMPLETE: CPT | Mod: 26

## 2021-09-21 PROCEDURE — 99232 SBSQ HOSP IP/OBS MODERATE 35: CPT

## 2021-09-21 RX ORDER — INSULIN LISPRO 100/ML
5 VIAL (ML) SUBCUTANEOUS
Refills: 0 | Status: DISCONTINUED | OUTPATIENT
Start: 2021-09-22 | End: 2021-09-22

## 2021-09-21 RX ADMIN — HEPARIN SODIUM 5000 UNIT(S): 5000 INJECTION INTRAVENOUS; SUBCUTANEOUS at 12:16

## 2021-09-21 RX ADMIN — HEPARIN SODIUM 5000 UNIT(S): 5000 INJECTION INTRAVENOUS; SUBCUTANEOUS at 21:30

## 2021-09-21 RX ADMIN — HEPARIN SODIUM 5000 UNIT(S): 5000 INJECTION INTRAVENOUS; SUBCUTANEOUS at 05:43

## 2021-09-21 RX ADMIN — AMLODIPINE BESYLATE 10 MILLIGRAM(S): 2.5 TABLET ORAL at 11:45

## 2021-09-21 RX ADMIN — Medication 100 MILLIGRAM(S): at 17:43

## 2021-09-21 RX ADMIN — Medication 1: at 07:55

## 2021-09-21 RX ADMIN — Medication 7 UNIT(S): at 17:33

## 2021-09-21 RX ADMIN — ATORVASTATIN CALCIUM 40 MILLIGRAM(S): 80 TABLET, FILM COATED ORAL at 21:30

## 2021-09-21 RX ADMIN — Medication 7 UNIT(S): at 11:44

## 2021-09-21 RX ADMIN — Medication 100 MILLIGRAM(S): at 05:45

## 2021-09-21 RX ADMIN — Medication 6 UNIT(S): at 07:57

## 2021-09-21 RX ADMIN — INSULIN GLARGINE 20 UNIT(S): 100 INJECTION, SOLUTION SUBCUTANEOUS at 21:29

## 2021-09-21 NOTE — PROGRESS NOTE ADULT - PROBLEM SELECTOR PROBLEM 1
Hypertensive urgency
Uncontrolled type 2 diabetes mellitus with hyperglycemia, without long-term current use of insulin
Hypertensive urgency
Hypertensive urgency

## 2021-09-21 NOTE — DISCHARGE NOTE PROVIDER - NSDCCPCAREPLAN_GEN_ALL_CORE_FT
PRINCIPAL DISCHARGE DIAGNOSIS  Diagnosis: Hypertensive urgency  Assessment and Plan of Treatment: Please follow up with your pcp within 1 week of discharge.  Please call to make an appointment 1 week of discharge.    - continue with amlodipine and labetalol as prescribed  - monitor BP and adjust regimen as needed (is at max dose amlodipine, can increase dose/frequency of labetalol as needed)  - denies current CP, had abnormal EKG on admission  -You had a sonogram of your heart which showed: minimal mitral regurgitation, normal right and left ventricle,  and mild idastolic dysfunction.    -no signs of acute coronary syndrome      SECONDARY DISCHARGE DIAGNOSES  Diagnosis: IVÁN (acute kidney injury)  Assessment and Plan of Treatment: Please follow up with your pcp within 1 week of discharge. Please call to make an appointment within 1 week of discaharge  - continue to monitor renal function, avoid nephrotoxins  Please follow up with your pcp for referal to a nephrologist.  Your creatinine on day of discharge was 2.00 and BUN was 48.  Please repeat basic metabolic panel within 1 week of dsicharge to monitor your creatinine levels.        Diagnosis: Hyperlipidemia, acquired  Assessment and Plan of Treatment: Continue cholesterol control medications. Continue DASH diet. Follow up with your PCP within 1 week of discharge for further management and monitoring of lipid and cholesterol panels.    Diagnosis: Type 2 diabetes mellitus with hyperglycemia, without long-term current use of insulin  Assessment and Plan of Treatment: Continue your medication regimen and a consistent carbohydrate diet (Meaning eating the same amount of carbohydrates at the same time each day). Monitor blood glucose levels throughout the day before meals and at bedtime. Record blood sugars and bring to outpatient providers appointment in order to be reviewed by your doctor for management modifications. If your sugars are more than 400 or less than 70 you should contact your PCP immediately. Monitor for signs/symptoms of low blood glucose, such as, dizziness, altered mental status, or cool/clammy skin. In addition, monitor for signs/symptoms of high blood glucose, such as, feeling hot, dry, fatigued, or with increased thirst/urination. Make regular podiatry appointments in order to have feet checked for wounds and uncontrolled toe nail growth to prevent infections, as well as, appointments with an ophthalmologist to monitor your vision. - given insurance issues and inability to self administer insulin via vial or pen, endocrine recommending d/c on oral agents: prandin 1 mg before meals, and actos 15 mg daily, avoid sulfonylurea given CKD, can continue home januvia  - had counseled patient and  on importance of otpt endo f/u (as well as eye and foot care)    Diagnosis: Metabolic acidosis  Assessment and Plan of Treatment: Please follow up with your pcp within 1 week of discharge.  Please call to make an appointment within 1 week of discharge.  Encourage oral intake.  Please repeat basic metabolic panel to monitor anion gap.  Anion gap 16 on day of discharge.  most likely from elevated creatinine levels.     PRINCIPAL DISCHARGE DIAGNOSIS  Diagnosis: Hypertensive urgency  Assessment and Plan of Treatment: Please follow up with your pcp within 1 week of discharge.  Please call to make an appointment 1 week of discharge.    - continue with amlodipine and labetalol as prescribed  - monitor BP and adjust regimen as needed (is at max dose amlodipine, can increase dose/frequency of labetalol as needed)  - denies current CP, had abnormal EKG on admission  -You had a sonogram of your heart which showed: minimal mitral regurgitation, normal right and left ventricle,  and mild idastolic dysfunction.    -no signs of acute coronary syndrome      SECONDARY DISCHARGE DIAGNOSES  Diagnosis: IVÁN (acute kidney injury)  Assessment and Plan of Treatment: Please follow up with your pcp within 1 week of discharge. Please call to make an appointment within 1 week of discaharge  - continue to monitor renal function, avoid nephrotoxins  Please follow up with your pcp for referal to a nephrologist.  Your creatinine on day of discharge was 2.00 and BUN was 48.  Please repeat basic metabolic panel within 1 week of dsicharge to monitor your creatinine levels.        Diagnosis: Hyperlipidemia, acquired  Assessment and Plan of Treatment: Continue cholesterol control medications. Continue DASH diet. Follow up with your PCP within 1 week of discharge for further management and monitoring of lipid and cholesterol panels.    Diagnosis: Type 2 diabetes mellitus with hyperglycemia, without long-term current use of insulin  Assessment and Plan of Treatment: Continue your medication regimen and a consistent carbohydrate diet (Meaning eating the same amount of carbohydrates at the same time each day). Monitor blood glucose levels throughout the day before meals and at bedtime. Record blood sugars and bring to outpatient providers appointment in order to be reviewed by your doctor for management modifications. If your sugars are more than 400 or less than 70 you should contact your PCP immediately. Monitor for signs/symptoms of low blood glucose, such as, dizziness, altered mental status, or cool/clammy skin. In addition, monitor for signs/symptoms of high blood glucose, such as, feeling hot, dry, fatigued, or with increased thirst/urination. Make regular podiatry appointments in order to have feet checked for wounds and uncontrolled toe nail growth to prevent infections, as well as, appointments with an ophthalmologist to monitor your vision. - given insurance issues and inability to self administer insulin via vial or pen, endocrine recommending d/c on oral agents: prandin 1 mg before meals, and actos 15 mg daily, avoid sulfonylurea given CKD, can continue home januvia  - had counseled patient and  on importance of otpt endo f/u (as well as eye and foot care) Please follow up with endocrinology within 1 weekof discharge.  Please call to make an appointment within 1 week of discharge.  [phone]    Diagnosis: Metabolic acidosis  Assessment and Plan of Treatment: Please follow up with your pcp within 1 week of discharge.  Please call to make an appointment within 1 week of discharge.  Encourage oral intake.  Please repeat basic metabolic panel to monitor anion gap.  Anion gap 16 on day of discharge.  most likely from elevated creatinine levels.     PRINCIPAL DISCHARGE DIAGNOSIS  Diagnosis: Hypertensive urgency  Assessment and Plan of Treatment: Please follow up with your pcp within 1 week of discharge.  Please call to make an appointment 1 week of discharge.    - continue with amlodipine and labetalol as prescribed  - monitor BP and adjust regimen as needed (is at max dose amlodipine, can increase dose/frequency of labetalol as needed)  - denies current CP, had abnormal EKG on admission  -You had a sonogram of your heart which showed: minimal mitral regurgitation, normal right and left ventricle,  and mild idastolic dysfunction.    -no signs of acute coronary syndrome      SECONDARY DISCHARGE DIAGNOSES  Diagnosis: Type 2 diabetes mellitus with hyperglycemia, without long-term current use of insulin  Assessment and Plan of Treatment: Continue your medication regimen and a consistent carbohydrate diet (Meaning eating the same amount of carbohydrates at the same time each day). Monitor blood glucose levels throughout the day before meals and at bedtime. Record blood sugars and bring to outpatient providers appointment in order to be reviewed by your doctor for management modifications. If your sugars are more than 400 or less than 70 you should contact your PCP immediately. Monitor for signs/symptoms of low blood glucose, such as, dizziness, altered mental status, or cool/clammy skin. In addition, monitor for signs/symptoms of high blood glucose, such as, feeling hot, dry, fatigued, or with increased thirst/urination. Make regular podiatry appointments in order to have feet checked for wounds and uncontrolled toe nail growth to prevent infections, as well as, appointments with an ophthalmologist to monitor your vision. - given insurance issues and inability to self administer insulin via vial or pen, endocrine recommending d/c on oral agents: prandin 1 mg before meals, and actos 15 mg daily, avoid sulfonylurea given CKD, can continue home januvia  - had counseled patient and  on importance of otpt endo f/u (as well as eye and foot care) Please follow up with endocrinology within 1 weekof discharge.  Please call to make an appointment within 1 week of discharge.  [phone]    Diagnosis: Hyperlipidemia, acquired  Assessment and Plan of Treatment: Continue cholesterol control medications. Continue DASH diet. Follow up with your PCP within 1 week of discharge for further management and monitoring of lipid and cholesterol panels.    Diagnosis: Metabolic acidosis  Assessment and Plan of Treatment: Please follow up with your pcp within 1 week of discharge.  Please call to make an appointment within 1 week of discharge.  Encourage oral intake.  Please repeat basic metabolic panel to monitor anion gap.  Anion gap 16 on day of discharge.  most likely from chronic kidney disease    Diagnosis: Stage 4 chronic kidney disease  Assessment and Plan of Treatment: You likely have chronic kidney disease related to diabetes and hypertension.   Please follow up with your pcp within 1 week of discharge. Please call to make an appointment within 1 week of discaharge  - continue to monitor renal function, avoid nephrotoxins (no NSAIDS)  Please follow up with your pcp for referal to a nephrologist.  Your creatinine on day of discharge was 2.00 and BUN was 48.  Please repeat basic metabolic panel within 1 week of dsicharge to monitor your creatinine levels.

## 2021-09-21 NOTE — DISCHARGE NOTE PROVIDER - CARE PROVIDER_API CALL
Alexander Goetz (MD)  Endocrinology[facility]ab[facility]abetes  [address] [place] [address]  Phone: [phone]  Fax: [phone]  Follow Up Time:

## 2021-09-21 NOTE — PROGRESS NOTE ADULT - PROBLEM SELECTOR PROBLEM 3
Abnormal EKG
Abnormal EKG
Hyperlipidemia, acquired
Hyperlipidemia, acquired
Abnormal EKG
Hyperlipidemia, acquired
Hyperlipidemia, acquired

## 2021-09-21 NOTE — DISCHARGE NOTE PROVIDER - CARE PROVIDERS DIRECT ADDRESSES
MRN:[id number]                      After Visit Summary   3/8/2017    Earnest Peterson    MRN: [id number]           Visit Information        Department      3/8/2017  6:43 AM [facility]          Review of your medicines      CONTINUE these medicines which may have CHANGED, or have new prescriptions. If we are uncertain of the size of tablets/capsules you have at home, strength may be listed as something that might have changed.        Dose / Directions    ibuprofen 800 MG tablet   Commonly known as:  ADVIL/MOTRIN   This may have changed:  how much to take   Used for:  Upper back pain on left side        Dose:  800 mg   Take 1 tablet (800 mg) by mouth every 8 hours as needed for moderate pain   Quantity:  30 tablet   Refills:  0                Protect others around you: Learn how to safely use, store and throw away your medicines at www.disposemymeds.org.         Follow-ups after your visit         Care Instructions        After Care Instructions     Discharge Instructions - Follow up with Device Check RN        Follow up with Device Check RN in 7-10 days.            Discharge Instructions - Keep incision dry for 72 hours       Keep incision dry for 72 hours (unless Derma Finley was applied)                  Further instructions from your care team       SVT Ablation Discharge Instructions - Femoral     After you go home:      Have an adult stay with you until tomorrow.    You may resume your normal diet.       For 24 hours - due to the sedation you received:    Relax and take it easy.    Do NOT make any important or legal decisions.    Do NOT drive or operate machines at home or at work.    Do NOT drink alcohol.    Care of Groin Puncture Site:      For the first 24 hrs - check the puncture site every 1-2 hours while awake.    For 2 days, when you cough, sneeze, laugh or move your bowels, hold your hand over the puncture site and press firmly.    Remove the bandaid after 24 hours.  If there is minor oozing, apply another bandaid and remove it after 12 hours.    It is normal to have a small bruise or pea size lump at the site.    You may shower tomorrow.  Do NOT take a bath, or use a hot tub or pool for at least 3 days. Do NOT scrub the site. Do not use lotion or powder near the puncture site.    Activity:            For 2 days:    No stooping or squatting    Do NOT do any heavy activity such as exercise, lifting, or straining.     No housework, yard work or any activity that make you sweat    Do NOT lift more than 10 pounds    Bleeding:      If you start bleeding from the site in your groin, lie down flat and press firmly on the site for 10 minutes.     Once bleeding stops, lay flat for 2 hours.    Call [facility] as soon as you can.       Call 911 right away if you have heavy bleeding or bleeding that does not stop.      Medicines:      Take your medications, including blood thinners, unless your provider tells you not to.    If you have stopped any other medicines, check with your provider about when to restart them.    If you have pain or shortness of breath, you may take Advil (ibuprofen) or Tylenol (acetaminophen).    Follow Up Appointments:      An appointment has been set up for you for follow-up care    You will receive a phone call tomorrow morning from Diana MARY or Joan MARY.    Call the clinic if:      You have increased pain or a large or growing hard lump around the site.    The site is red, swollen, hot or tender.    Blood or fluid is draining from the site.    You have chills or a fever greater than 101 F (38 C).    Your leg feels numb, cool or changes color.    Increased pain in the chest and/or groin.    Increased shortness of breath    Chest pain not relieved by Tylenol or Advil    New pain in the back or belly that you cannot control with Tylenol.    Recurrent irregular or fast heart rate (AFlutter) lasting over 2 hours.    Any questions or concerns.    Heart  "rhythms:    You may have some irregular heartbeats. These feel very strong. They may make you feel that the fast heart rhythm is going to start again.  Give it time. The irregular beats should occur less often.       [facility]:    [phone] ( 8am-5pm M-F)  USMAN Ortiz or USMAN Franco    [phone] [facility] (7 days a week)                 Additional Information About Your Visit        [employer] Information     Pinterest gives you secure access to your electronic health record. If you see a primary care provider, you can also send messages to your care team and make appointments. If you have questions, please call your primary care clinic.  If you do not have a primary care provider, please call [phone] and they will assist you.        Care EveryWhere ID     This is your Care EveryWhere ID. This could be used by other organizations to access your [facility] [place] medical records  [id number]        Your Vitals Were     Blood Pressure Temperature Respirations Height Weight Pulse Oximetry    125/69 96.9  F (36.1  C) 16 1.905 m (6' 3\") 106 kg (233 lb 9.6 oz) 99%    BMI (Body Mass Index)                   29.2 kg/m2            Primary Care Provider Office Phone # Fax #    Yarely SAUCEDO BRINDA Gonsalves [facility] [phone]      Thank you!     Thank you for choosing [facility] for your care. Our goal is always to provide you with excellent care. Hearing back from our patients is one way we can continue to improve our services. Please take a few minutes to complete the written survey that you may receive in the mail after you visit with us. Thank you!             Medication List: This is a list of all your medications and when to take them. Check marks below indicate your daily home schedule. Keep this list as a reference.      Medications           Morning Afternoon Evening Bedtime As Needed    ibuprofen 800 MG tablet   Commonly known as:  ADVIL/MOTRIN   Take 1 tablet (800 mg) by mouth every 8 hours as needed " for moderate pain                                   [email or web address]

## 2021-09-21 NOTE — PROGRESS NOTE ADULT - PROBLEM SELECTOR PROBLEM 2
Essential hypertension
IVÁN (acute kidney injury)

## 2021-09-21 NOTE — DISCHARGE NOTE PROVIDER - NSDCFUADDAPPT_GEN_ALL_CORE_FT
Please follow up with your pcp [phone] within 1 week of discharge.  Please call to make an appointment within 1 week of discharge.       Please follow up with your pcp Dr. ndiaye  [phone] within 1 week of discharge.  Please call to make an appointment within 1 week of discharge.

## 2021-09-21 NOTE — PROGRESS NOTE ADULT - SUBJECTIVE AND OBJECTIVE BOX
[facility]  Pager [phone]    Patient is a 66y old  Female who presents with a chief complaint of Hypertensive urgency (21 Sep 2021 10:46)      INTERVAL HPI/OVERNIGHT EVENTS:    MEDICATIONS  (STANDING):  amLODIPine   Tablet 10 milliGRAM(s) Oral every 24 hours  atorvastatin 40 milliGRAM(s) Oral at bedtime  dextrose 40% Gel 15 Gram(s) Oral once  dextrose 5%. 1000 milliLiter(s) (50 mL/Hr) IV Continuous <Continuous>  dextrose 5%. 1000 milliLiter(s) (100 mL/Hr) IV Continuous <Continuous>  dextrose 50% Injectable 25 Gram(s) IV Push once  dextrose 50% Injectable 12.5 Gram(s) IV Push once  dextrose 50% Injectable 25 Gram(s) IV Push once  glucagon  Injectable 1 milliGRAM(s) IntraMuscular once  heparin   Injectable 5000 Unit(s) SubCutaneous every 8 hours  influenza   Vaccine 0.5 milliLiter(s) IntraMuscular once  insulin glargine Injectable (LANTUS) 20 Unit(s) SubCutaneous at bedtime  insulin lispro (ADMELOG) corrective regimen sliding scale   SubCutaneous three times a day before meals  insulin lispro (ADMELOG) corrective regimen sliding scale   SubCutaneous at bedtime  insulin lispro Injectable (ADMELOG) 6 Unit(s) SubCutaneous before breakfast  insulin lispro Injectable (ADMELOG) 7 Unit(s) SubCutaneous before lunch  insulin lispro Injectable (ADMELOG) 7 Unit(s) SubCutaneous before dinner  labetalol 100 milliGRAM(s) Oral every 12 hours    MEDICATIONS  (PRN):      Allergies    No Known Allergies    Intolerances        REVIEW OF SYSTEMS:  Please see interval HPI:    Vital Signs Last 24 Hrs  T(C): 36.9 (21 Sep 2021 11:45), Max: 36.9 (21 Sep 2021 11:45)  T(F): 98.5 (21 Sep 2021 11:45), Max: 98.5 (21 Sep 2021 11:45)  HR: 74 (21 Sep 2021 11:45) (56 - 92)  BP: 144/80 (21 Sep 2021 11:45) (130/73 - 152/90)  BP(mean): --  RR: 17 (21 Sep 2021 11:45) (17 - 18)  SpO2: 100% (21 Sep 2021 11:45) (99% - 100%)  I&O's Detail        PHYSICAL EXAM:  GENERAL:   HEAD:    EYES:   ENMT:   NECK:   NERVOUS SYSTEM:    CHEST/LUNG:   HEART:   ABDOMEN:   EXTREMITIES:    LYMPH:   SKIN:     LABS:                        9.7    6.73  )-----------( 242      ( 21 Sep 2021 07:03 )             29.0     21 Sep 2021 07:03    138    |  105    |  43     ----------------------------<  191    4.2     |  19     |  1.84     Ca    9.5        21 Sep 2021 07:03  Phos  5.4       21 Sep 2021 07:03  Mg     2.00      21 Sep 2021 07:03        CAPILLARY BLOOD GLUCOSE      POCT Blood Glucose.: 90 mg/dL (21 Sep 2021 11:37)  POCT Blood Glucose.: 168 mg/dL (21 Sep 2021 07:39)  POCT Blood Glucose.: 203 mg/dL (20 Sep 2021 20:40)  POCT Blood Glucose.: 307 mg/dL (20 Sep 2021 16:44)    BLOOD CULTURE    RADIOLOGY & ADDITIONAL TESTS:    Imaging Personally Reviewed:  [ ] YES     Consultant(s) Notes Reviewed:      Care Discussed with Consultants/Other Providers: TONI Magallanes re: endocrine recs re: discharge regimen, expediting echo, BP better controlled, possible d/c home today [facility] Medicine  Pager [phone]    Patient is a 66y old  Female who presents with a chief complaint of Hypertensive urgency (21 Sep 2021 10:46)      INTERVAL HPI/OVERNIGHT EVENTS:   Patient denies CP/SOB/headache, still w/ visual problem, overall feeling well. Reinforced importance of medication compliance and outpatient followup. Is pending echo.      MEDICATIONS  (STANDING):  amLODIPine   Tablet 10 milliGRAM(s) Oral every 24 hours  atorvastatin 40 milliGRAM(s) Oral at bedtime  dextrose 40% Gel 15 Gram(s) Oral once  dextrose 5%. 1000 milliLiter(s) (50 mL/Hr) IV Continuous <Continuous>  dextrose 5%. 1000 milliLiter(s) (100 mL/Hr) IV Continuous <Continuous>  dextrose 50% Injectable 25 Gram(s) IV Push once  dextrose 50% Injectable 12.5 Gram(s) IV Push once  dextrose 50% Injectable 25 Gram(s) IV Push once  glucagon  Injectable 1 milliGRAM(s) IntraMuscular once  heparin   Injectable 5000 Unit(s) SubCutaneous every 8 hours  influenza   Vaccine 0.5 milliLiter(s) IntraMuscular once  insulin glargine Injectable (LANTUS) 20 Unit(s) SubCutaneous at bedtime  insulin lispro (ADMELOG) corrective regimen sliding scale   SubCutaneous three times a day before meals  insulin lispro (ADMELOG) corrective regimen sliding scale   SubCutaneous at bedtime  insulin lispro Injectable (ADMELOG) 6 Unit(s) SubCutaneous before breakfast  insulin lispro Injectable (ADMELOG) 7 Unit(s) SubCutaneous before lunch  insulin lispro Injectable (ADMELOG) 7 Unit(s) SubCutaneous before dinner  labetalol 100 milliGRAM(s) Oral every 12 hours    MEDICATIONS  (PRN):    Allergies  No Known Allergies    Intolerances    REVIEW OF SYSTEMS:  Please see interval HPI:    Vital Signs Last 24 Hrs  T(C): 36.9 (21 Sep 2021 11:45), Max: 36.9 (21 Sep 2021 11:45)  T(F): 98.5 (21 Sep 2021 11:45), Max: 98.5 (21 Sep 2021 11:45)  HR: 74 (21 Sep 2021 11:45) (56 - 92)  BP: 144/80 (21 Sep 2021 11:45) (130/73 - 152/90)  BP(mean): --  RR: 17 (21 Sep 2021 11:45) (17 - 18)  SpO2: 100% (21 Sep 2021 11:45) (99% - 100%)  I&O's Detail    PHYSICAL EXAM:  GENERAL: NAD, sleeping but arousable to voice  HEAD:  NC/AT  EYES: EOMI, clear sclera/conjunctiva  ENMT: MMM  NECK: supple  NERVOUS SYSTEM: moving all extremities, non-focal    CHEST/LUNG: CTAB, comfortable on RA, speaking in full sentences  HEART: S1S2 RRR  ABDOMEN: soft, non-tender  EXTREMITIES:  no c/c/e    LABS:                        9.7    6.73  )-----------( 242      ( 21 Sep 2021 07:03 )             29.0     21 Sep 2021 07:03    138    |  105    |  43     ----------------------------<  191    4.2     |  19     |  1.84     Ca    9.5        21 Sep 2021 07:03  Phos  5.4       21 Sep 2021 07:03  Mg     2.00      21 Sep 2021 07:03        CAPILLARY BLOOD GLUCOSE  POCT Blood Glucose.: 90 mg/dL (21 Sep 2021 11:37)  POCT Blood Glucose.: 168 mg/dL (21 Sep 2021 07:39)  POCT Blood Glucose.: 203 mg/dL (20 Sep 2021 20:40)  POCT Blood Glucose.: 307 mg/dL (20 Sep 2021 16:44)    BLOOD CULTURE    RADIOLOGY & ADDITIONAL TESTS:    Imaging Personally Reviewed:  [ ] YES     Consultant(s) Notes Reviewed:  Endocrine    Care Discussed with Consultants/Other Providers: TONI Magallanes re: endocrine recs re: discharge regimen, expediting echo, BP better controlled, possible d/c home today

## 2021-09-21 NOTE — DISCHARGE NOTE PROVIDER - HOSPITAL COURSE
66F h/o HTN, DM presents with elevated blood pressure      Hypertensive urgency  - ?setting of poor health literacy and non-adherence?  - c/w amlodipine and labetalol tid  - monitor BP and adjust regimen as needed (is at max dose amlodipine, can increase dose/frequency of labetalol as needed)  - SBP 130s earlier this morning, most recently 140s  - goal SBP <130 given diabetes  - denies current CP, had abnormal EKG on admission  -Echo: showed:1. Mitral annular calcification, otherwise normal mitral  valve. Minimal mitral regurgitation.  2. Normal left ventricular internal dimensions and wall  thicknesses.  3. Normal left ventricular systolic function. No segmental  wall motion abnormalities.  4. Mild diastolic dysfunction (Stage I).  5. Normal right ventricular size and function.   trop 27 -> 28, unlikely to be ACS    # Uncontrolled DM2 c/b hyperglycemia, CKD  - holding home januvia at this time  - A1C 12.4  - endocrine input appreciated, currently on Lantus 20, admelog 5/7/7U, sliding scale coverage as per endocrine (decreased morning admelog dose given FS 90 prelunch)  - given insurance issues and inability to self administer insulin via vial or pen, endocrine recommending d/c on oral agents: prandin 1 mg before meals, and actos 15 mg daily, avoid sulfonylurea given CKD, can continue home januvia  - had counseled patient and  on importance of otpt endo f/u (as well as eye and foot care)    # HLD  - c/w statin  - LDL elevated    # CKD IV  - review of Cr trend c/w CKD IV  - would benefit from otpt renal f/u  - continue to monitor renal function, avoid nephrotoxins    # Need for prophylactic measure  - VTE ppx: HSQ    On 9/22/2021 discussed with Dr. Fontanez , patient is medically cleared and optimized for discharge today. All medications were reviewed with attending, and sent to mutually agreed upon pharmacy.  66F h/o HTN, DM presents with elevated blood pressure      Hypertensive urgency  - ?setting of poor health literacy and non-adherence?  - c/w amlodipine and labetalol tid  - monitor BP and adjust regimen as needed (is at max dose amlodipine, can increase dose/frequency of labetalol as needed)  - SBP 130s earlier this morning, most recently 140s  - goal SBP <130 given diabetes  - denies current CP, had abnormal EKG on admission  -Echo: showed:1. Mitral annular calcification, otherwise normal mitral  valve. Minimal mitral regurgitation.  2. Normal left ventricular internal dimensions and wall  thicknesses.  3. Normal left ventricular systolic function. No segmental  wall motion abnormalities.  4. Mild diastolic dysfunction (Stage I).  5. Normal right ventricular size and function.   trop 27 -> 28, unlikely to be ACS    # Uncontrolled DM2 c/b hyperglycemia, CKD  - holding home januvia at this time  - A1C 12.4  - endocrine input appreciated, currently on Lantus 20, admelog 5/7/7U, sliding scale coverage as per endocrine (decreased morning admelog dose given FS 90 prelunch)  - given insurance issues and inability to self administer insulin via vial or pen, endocrine recommending d/c on oral agents: prandin 1 mg before meals, and actos 15 mg daily, avoid sulfonylurea given CKD, can continue home januvia  - had counseled patient and  on importance of otpt endo f/u (as well as eye and foot care)    # HLD  - c/w statin  - LDL elevated    # CKD IV  - review of Cr trend c/w CKD IV  - would benefit from otpt renal f/u  - continue to monitor renal function, avoid nephrotoxins    # Need for prophylactic measure  - VTE ppx: HSQ    On 9/22/2021 discussed with Dr. Fontanez , patient is medically cleared and optimized for discharge today. All medications were reviewed with attending, and sent to mutually agreed upon pharmacy. Confirmed with [facility] meds readyfor  67 yo F w/ HTN, HLD, DM2 c/b hyperglycemia, CKDIV?, a/w hypertensive urgency, BP now better controlled with medications, stable for discharge with outpatient followup. Also with poorly controlled diabetes, evaluated by endocrine, not a candidate for insulin therapy given vision issues, insurance issues and inability to self administer, discharged on oral regimen.     # Hypertensive urgency  - ?setting of poor health literacy and non-adherence?  - c/w amlodipine and labetalol tid  - monitor BP and adjust regimen as needed (is at max dose amlodipine, can increase dose/frequency of labetalol if needed)  - SBP 130s - 140s, improved on medications  - goal SBP <130 given diabetes  - counseled on importance of otpt followup  - denies current CP, had abnormal EKG on admission however echo w/ normal LV systolic function, trop 27 -> 28, unlikely to be ACS  - Echo:   1. Mitral annular calcification, otherwise normal mitral  valve. Minimal mitral regurgitation.  2. Normal left ventricular internal dimensions and wall  thicknesses.  3. Normal left ventricular systolic function. No segmental  wall motion abnormalities.  4. Mild diastolic dysfunction (Stage I).  5. Normal right ventricular size and function.    # Uncontrolled DM2 c/b hyperglycemia, CKD  - A1C 12.4  - endocrine input appreciated, managed w/ basal/bolus regimen while hospitalized ( Lantus 20, admelog 5/7/7U, sliding scale coverage as per endocrine)  - given insurance issues and inability to self administer insulin via vial or pen, endocrine recommending d/c on oral agents: prandin 1 mg before meals, and actos 15 mg daily, avoid sulfonylurea given CKD, can continue home januvia  - had counseled patient and  on importance of otpt endo f/u (as well as eye and foot care)    # HLD  - c/w statin  - LDL elevated    # CKD IV  - review of Cr trend c/w CKD IV  - would benefit from otpt renal f/u  - continue to monitor renal function, avoid nephrotoxins    # Need for prophylactic measure  - VTE ppx: HSQ    On 9/22/2021 discussed with Dr. Fontanez , patient is medically cleared and optimized for discharge today. All medications were reviewed with attending, and sent to mutually agreed upon pharmacy. Confirmed with [facility] meds readyfor

## 2021-09-21 NOTE — DISCHARGE NOTE PROVIDER - NSDCMRMEDTOKEN_GEN_ALL_CORE_FT
Actos 15 mg oral tablet: 1 tab(s) orally once a day   alcohol swabs : Apply topically to affected area 4 times a day         amLODIPine 10 mg oral tablet: 1 tab(s) orally once a day  glucometer (per patient&#x27;s insurance): Test blood sugars four times a day. Dispense #1 glucometer.    Talking Glucometer  glucose tablets: Follow instructions on bottle when sugar is low.  Insulin Pen Needles, 4mm: 1 application subcutaneously 4 times a day. ** Use with insulin pen **   Januvia 25 mg oral tablet: 1 tab(s) orally once a day  lancets: 1 application subcutaneously 4 times a day   losartan 100 mg oral tablet: 1 tab(s) orally once a day  Prandin 1 mg oral tablet: 1 tab(s) orally 3 times a day (before meals)    Actos 15 mg oral tablet: 1 tab(s) orally once a day   alcohol swabs : Apply topically to affected area 4 times a day         amLODIPine 10 mg oral tablet: 1 tab(s) orally once a day  atorvastatin 40 mg oral tablet: 1 tab(s) orally once a day (at bedtime)  glucometer (per patient&#x27;s insurance): Test blood sugars four times a day. Dispense #1 glucometer.    Talking Glucometer  glucose tablets: Follow instructions on bottle when sugar is low.  Insulin Pen Needles, 4mm: 1 application subcutaneously 4 times a day. ** Use with insulin pen **   Januvia 25 mg oral tablet: 1 tab(s) orally once a day  just take until supply is completed    labetalol 100 mg oral tablet: 1 tab(s) orally every 12 hours  lancets: 1 application subcutaneously 4 times a day   Prandin 1 mg oral tablet: 1 tab(s) orally 3 times a day (before meals)    Actos 15 mg oral tablet: 1 tab(s) orally once a day   alcohol swabs : Apply topically to affected area 4 times a day         amLODIPine 10 mg oral tablet: 1 tab(s) orally once a day  atorvastatin 40 mg oral tablet: 1 tab(s) orally once a day (at bedtime)  glucometer (per patient&#x27;s insurance): Test blood sugars four times a day. Dispense #1 glucometer.    Talking Glucometer  glucose tablets: Follow instructions on bottle when sugar is low.  Januvia 25 mg oral tablet: 1 tab(s) orally once a day  just take until supply is completed    labetalol 100 mg oral tablet: 1 tab(s) orally every 12 hours  lancets: 1 application subcutaneously 4 times a day   Prandin 1 mg oral tablet: 1 tab(s) orally 3 times a day (before meals)   test strips (per patient&#x27;s insurance): 1 application subcutaneously 4 times a day . ** Compatible with patient&#x27;s glucometer **   please call me with copay [id number]

## 2021-09-21 NOTE — PROGRESS NOTE ADULT - SUBJECTIVE AND OBJECTIVE BOX
Chief Complaint: DM 2    History: Patient seen at bedside. Reports she is eating meals, no complaints  Tightly controlled glucose noted at lunch - 90 mg/dl   Patient was unable to successfully return demonstrate insulin self-injection yesterday with [facility] pharmacist (PEN or VIAL)     MEDICATIONS  (STANDING):  amLODIPine   Tablet 10 milliGRAM(s) Oral every 24 hours  atorvastatin 40 milliGRAM(s) Oral at bedtime  dextrose 40% Gel 15 Gram(s) Oral once  dextrose 5%. 1000 milliLiter(s) (50 mL/Hr) IV Continuous <Continuous>  dextrose 5%. 1000 milliLiter(s) (100 mL/Hr) IV Continuous <Continuous>  dextrose 50% Injectable 25 Gram(s) IV Push once  dextrose 50% Injectable 12.5 Gram(s) IV Push once  dextrose 50% Injectable 25 Gram(s) IV Push once  glucagon  Injectable 1 milliGRAM(s) IntraMuscular once  heparin   Injectable 5000 Unit(s) SubCutaneous every 8 hours  influenza   Vaccine 0.5 milliLiter(s) IntraMuscular once  insulin glargine Injectable (LANTUS) 20 Unit(s) SubCutaneous at bedtime  insulin lispro (ADMELOG) corrective regimen sliding scale   SubCutaneous three times a day before meals  insulin lispro (ADMELOG) corrective regimen sliding scale   SubCutaneous at bedtime  insulin lispro Injectable (ADMELOG) 6 Unit(s) SubCutaneous before breakfast  insulin lispro Injectable (ADMELOG) 7 Unit(s) SubCutaneous before lunch  insulin lispro Injectable (ADMELOG) 7 Unit(s) SubCutaneous before dinner  labetalol 100 milliGRAM(s) Oral every 12 hours    No Known Allergies    Review of Systems:  Cardiovascular: No chest pain  Respiratory: No SOB  GI: No nausea, vomiting  Endocrine: no hypoglycemia symptoms     PHYSICAL EXAM:  VITALS: T(C): 36.9 (09-21-21 @ 11:45)  T(F): 98.5 (09-21-21 @ 11:45), Max: 98.5 (09-21-21 @ 11:45)  HR: 74 (09-21-21 @ 11:45) (56 - 92)  BP: 144/80 (09-21-21 @ 11:45) (135/60 - 152/90)  RR:  (17 - 18)  SpO2:  (99% - 100%)  Wt(kg): --  GENERAL: NAD  EYES: No proptosis, no lid lag, anicteric  HEENT:  Atraumatic, Normocephalic, moist mucous membranes  RESPIRATORY: unlabored respirations     CAPILLARY BLOOD GLUCOSE    POCT Blood Glucose.: 90 mg/dL (21 Sep 2021 11:37)  POCT Blood Glucose.: 168 mg/dL (21 Sep 2021 07:39)  POCT Blood Glucose.: 203 mg/dL (20 Sep 2021 20:40)  POCT Blood Glucose.: 307 mg/dL (20 Sep 2021 16:44)      09-21    138  |  105  |  43<H>  ----------------------------<  191<H>  4.2   |  19<L>  |  1.84<H>    EGFR if : 33<L>  EGFR if non : 28<L>    Ca    9.5      09-21  Mg     2.00     09-21  Phos  5.4     09-21        Thyroid Function Tests:  09-17 @ 06:27 TSH 1.48 FreeT4 -- T3 -- Anti TPO -- Anti Thyroglobulin Ab -- TSI --      A1C with Estimated Average Glucose Result: 12.4 % (09-17-21 @ 06:27)  A1C with Estimated Average Glucose Result: 12.2 % (09-16-21 @ 21:05)    Diet, Regular:   Consistent Carbohydrate No Snacks (CSTCHO)  DASH/TLC Sodium & Cholesterol Restricted (DASH)  Supplement Feeding Modality:  Oral  Glucerna Shake Cans or Servings Per Day:  1       Frequency:  Daily (09-18-21 @ 15:30)

## 2021-09-22 VITALS
RESPIRATION RATE: 17 BRPM | TEMPERATURE: 98 F | OXYGEN SATURATION: 100 % | DIASTOLIC BLOOD PRESSURE: 87 MMHG | SYSTOLIC BLOOD PRESSURE: 144 MMHG | HEART RATE: 90 BPM

## 2021-09-22 LAB
ANION GAP SERPL CALC-SCNC: 16 MMOL/L — HIGH (ref 7–14)
BUN SERPL-MCNC: 48 MG/DL — HIGH (ref 7–23)
CALCIUM SERPL-MCNC: 9.6 MG/DL — SIGNIFICANT CHANGE UP (ref 8.4–10.5)
CHLORIDE SERPL-SCNC: 104 MMOL/L — SIGNIFICANT CHANGE UP (ref 98–107)
CO2 SERPL-SCNC: 20 MMOL/L — LOW (ref 22–31)
CREAT SERPL-MCNC: 2 MG/DL — HIGH (ref 0.5–1.3)
GLUCOSE SERPL-MCNC: 107 MG/DL — HIGH (ref 70–99)
HCT VFR BLD CALC: 30 % — LOW (ref 34.5–45)
HGB BLD-MCNC: 9.9 G/DL — LOW (ref 11.5–15.5)
MAGNESIUM SERPL-MCNC: 2 MG/DL — SIGNIFICANT CHANGE UP (ref 1.6–2.6)
MCHC RBC-ENTMCNC: 29.8 PG — SIGNIFICANT CHANGE UP (ref 27–34)
MCHC RBC-ENTMCNC: 33 GM/DL — SIGNIFICANT CHANGE UP (ref 32–36)
MCV RBC AUTO: 90.4 FL — SIGNIFICANT CHANGE UP (ref 80–100)
NRBC # BLD: 0 /100 WBCS — SIGNIFICANT CHANGE UP
NRBC # FLD: 0 K/UL — SIGNIFICANT CHANGE UP
PHOSPHATE SERPL-MCNC: 4.3 MG/DL — SIGNIFICANT CHANGE UP (ref 2.5–4.5)
PLATELET # BLD AUTO: 260 K/UL — SIGNIFICANT CHANGE UP (ref 150–400)
POTASSIUM SERPL-MCNC: 4.5 MMOL/L — SIGNIFICANT CHANGE UP (ref 3.5–5.3)
POTASSIUM SERPL-SCNC: 4.5 MMOL/L — SIGNIFICANT CHANGE UP (ref 3.5–5.3)
RBC # BLD: 3.32 M/UL — LOW (ref 3.8–5.2)
RBC # FLD: 12.5 % — SIGNIFICANT CHANGE UP (ref 10.3–14.5)
SODIUM SERPL-SCNC: 140 MMOL/L — SIGNIFICANT CHANGE UP (ref 135–145)
WBC # BLD: 7.37 K/UL — SIGNIFICANT CHANGE UP (ref 3.8–10.5)
WBC # FLD AUTO: 7.37 K/UL — SIGNIFICANT CHANGE UP (ref 3.8–10.5)

## 2021-09-22 PROCEDURE — 99239 HOSP IP/OBS DSCHRG MGMT >30: CPT

## 2021-09-22 RX ORDER — SITAGLIPTIN 50 MG/1
1 TABLET, FILM COATED ORAL
Qty: 0 | Refills: 0 | DISCHARGE

## 2021-09-22 RX ORDER — ATORVASTATIN CALCIUM 80 MG/1
1 TABLET, FILM COATED ORAL
Qty: 30 | Refills: 0
Start: 2021-09-22 | End: 2021-10-21

## 2021-09-22 RX ORDER — AMLODIPINE BESYLATE 2.5 MG/1
1 TABLET ORAL
Qty: 0 | Refills: 0 | DISCHARGE

## 2021-09-22 RX ORDER — LABETALOL HCL 100 MG
1 TABLET ORAL
Qty: 60 | Refills: 0
Start: 2021-09-22 | End: 2021-10-21

## 2021-09-22 RX ORDER — LOSARTAN POTASSIUM 100 MG/1
1 TABLET, FILM COATED ORAL
Qty: 0 | Refills: 0 | DISCHARGE

## 2021-09-22 RX ORDER — AMLODIPINE BESYLATE 2.5 MG/1
1 TABLET ORAL
Qty: 30 | Refills: 0
Start: 2021-09-22 | End: 2021-10-21

## 2021-09-22 RX ADMIN — Medication 7 UNIT(S): at 12:28

## 2021-09-22 RX ADMIN — AMLODIPINE BESYLATE 10 MILLIGRAM(S): 2.5 TABLET ORAL at 12:28

## 2021-09-22 RX ADMIN — Medication 5 UNIT(S): at 08:10

## 2021-09-22 RX ADMIN — HEPARIN SODIUM 5000 UNIT(S): 5000 INJECTION INTRAVENOUS; SUBCUTANEOUS at 05:49

## 2021-09-22 NOTE — PROGRESS NOTE ADULT - REASON FOR ADMISSION
Hypertensive urgency

## 2021-09-22 NOTE — PROGRESS NOTE ADULT - SUBJECTIVE AND OBJECTIVE BOX
[facility] Medicine  Pager [phone]    Patient is a 66y old  Female who presents with a chief complaint of Hypertensive urgency (21 Sep 2021 14:36)      INTERVAL HPI/OVERNIGHT EVENTS:    MEDICATIONS  (STANDING):  amLODIPine   Tablet 10 milliGRAM(s) Oral every 24 hours  atorvastatin 40 milliGRAM(s) Oral at bedtime  dextrose 40% Gel 15 Gram(s) Oral once  dextrose 5%. 1000 milliLiter(s) (50 mL/Hr) IV Continuous <Continuous>  dextrose 5%. 1000 milliLiter(s) (100 mL/Hr) IV Continuous <Continuous>  dextrose 50% Injectable 25 Gram(s) IV Push once  dextrose 50% Injectable 12.5 Gram(s) IV Push once  dextrose 50% Injectable 25 Gram(s) IV Push once  glucagon  Injectable 1 milliGRAM(s) IntraMuscular once  heparin   Injectable 5000 Unit(s) SubCutaneous every 8 hours  influenza   Vaccine 0.5 milliLiter(s) IntraMuscular once  insulin glargine Injectable (LANTUS) 20 Unit(s) SubCutaneous at bedtime  insulin lispro (ADMELOG) corrective regimen sliding scale   SubCutaneous three times a day before meals  insulin lispro (ADMELOG) corrective regimen sliding scale   SubCutaneous at bedtime  insulin lispro Injectable (ADMELOG) 7 Unit(s) SubCutaneous before lunch  insulin lispro Injectable (ADMELOG) 7 Unit(s) SubCutaneous before dinner  insulin lispro Injectable (ADMELOG) 5 Unit(s) SubCutaneous before breakfast  labetalol 100 milliGRAM(s) Oral every 12 hours    MEDICATIONS  (PRN):      Allergies    No Known Allergies    Intolerances        REVIEW OF SYSTEMS:  Please see interval HPI:    Vital Signs Last 24 Hrs  T(C): 36.7 (22 Sep 2021 09:40), Max: 36.8 (21 Sep 2021 15:45)  T(F): 98.1 (22 Sep 2021 09:40), Max: 98.2 (21 Sep 2021 15:45)  HR: 76 (22 Sep 2021 09:40) (72 - 84)  BP: 140/67 (22 Sep 2021 09:40) (103/75 - 150/76)  BP(mean): --  RR: 18 (22 Sep 2021 09:40) (17 - 18)  SpO2: 100% (22 Sep 2021 09:40) (100% - 100%)  I&O's Detail        PHYSICAL EXAM:  GENERAL:   HEAD:    EYES:   ENMT:   NECK:   NERVOUS SYSTEM:    CHEST/LUNG:   HEART:   ABDOMEN:   EXTREMITIES:    LYMPH:   SKIN:     LABS:                        9.9    7.37  )-----------( 260      ( 22 Sep 2021 07:18 )             30.0     22 Sep 2021 07:18    140    |  104    |  48     ----------------------------<  107    4.5     |  20     |  2.00     Ca    9.6        22 Sep 2021 07:18  Phos  4.3       22 Sep 2021 07:18  Mg     2.00      22 Sep 2021 07:18        CAPILLARY BLOOD GLUCOSE      POCT Blood Glucose.: 95 mg/dL (22 Sep 2021 11:45)  POCT Blood Glucose.: 110 mg/dL (22 Sep 2021 07:41)  POCT Blood Glucose.: 213 mg/dL (21 Sep 2021 20:53)  POCT Blood Glucose.: 130 mg/dL (21 Sep 2021 17:03)    BLOOD CULTURE    RADIOLOGY & ADDITIONAL TESTS:    Imaging Personally Reviewed:  [ ] YES     Consultant(s) Notes Reviewed:      Care Discussed with Consultants/Other Providers: [facility] Medicine  Pager [phone]    Patient is a 66y old  Female who presents with a chief complaint of Hypertensive urgency (21 Sep 2021 14:36)      INTERVAL HPI/OVERNIGHT EVENTS:  Denies chest pain/SOB/lightheadedness. Only issue is chronic vision problem. Is eager to return home today. Counseled patient on importance of BP and glycemic control, medication compliance, need for otpt followup (monitor BP, sugars, adjust medications as needed). Anticipatory guidance provided, including when to seek medical attention/return to hospital. Patient verbalized understanding, is in agreement w/ discharge plan. Discussed prescriptions, would like meds sent to [facility] (pharmacy downstairs) so can have meds in hand prior to leaving hospital.     MEDICATIONS  (STANDING):  amLODIPine   Tablet 10 milliGRAM(s) Oral every 24 hours  atorvastatin 40 milliGRAM(s) Oral at bedtime  dextrose 40% Gel 15 Gram(s) Oral once  dextrose 5%. 1000 milliLiter(s) (50 mL/Hr) IV Continuous <Continuous>  dextrose 5%. 1000 milliLiter(s) (100 mL/Hr) IV Continuous <Continuous>  dextrose 50% Injectable 25 Gram(s) IV Push once  dextrose 50% Injectable 12.5 Gram(s) IV Push once  dextrose 50% Injectable 25 Gram(s) IV Push once  glucagon  Injectable 1 milliGRAM(s) IntraMuscular once  heparin   Injectable 5000 Unit(s) SubCutaneous every 8 hours  influenza   Vaccine 0.5 milliLiter(s) IntraMuscular once  insulin glargine Injectable (LANTUS) 20 Unit(s) SubCutaneous at bedtime  insulin lispro (ADMELOG) corrective regimen sliding scale   SubCutaneous three times a day before meals  insulin lispro (ADMELOG) corrective regimen sliding scale   SubCutaneous at bedtime  insulin lispro Injectable (ADMELOG) 7 Unit(s) SubCutaneous before lunch  insulin lispro Injectable (ADMELOG) 7 Unit(s) SubCutaneous before dinner  insulin lispro Injectable (ADMELOG) 5 Unit(s) SubCutaneous before breakfast  labetalol 100 milliGRAM(s) Oral every 12 hours    MEDICATIONS  (PRN):    Allergies  No Known Allergies    Intolerances    REVIEW OF SYSTEMS:  Please see interval HPI:    Vital Signs Last 24 Hrs  T(C): 36.7 (22 Sep 2021 09:40), Max: 36.8 (21 Sep 2021 15:45)  T(F): 98.1 (22 Sep 2021 09:40), Max: 98.2 (21 Sep 2021 15:45)  HR: 76 (22 Sep 2021 09:40) (72 - 84)  BP: 140/67 (22 Sep 2021 09:40) (103/75 - 150/76)  RR: 18 (22 Sep 2021 09:40) (17 - 18)  SpO2: 100% (22 Sep 2021 09:40) (100% - 100%)  I&O's Detail    PHYSICAL EXAM:  GENERAL: NAD, sitting in bed, already starting to get dressed into street clothes  HEAD:  NC/AT  EYES: EOMI, clear sclera/conjunctiva  ENMT: MMM  NECK: supple  NERVOUS SYSTEM: moving all extremities, non-focal    CHEST/LUNG: CTAB, comfortable on RA, speaking in full sentences  HEART: S1S2 RRR  ABDOMEN: soft, non-tender  EXTREMITIES:  no c/c/e    LABS:                        9.9    7.37  )-----------( 260      ( 22 Sep 2021 07:18 )             30.0     22 Sep 2021 07:18    140    |  104    |  48     ----------------------------<  107    4.5     |  20     |  2.00     Ca    9.6        22 Sep 2021 07:18  Phos  4.3       22 Sep 2021 07:18  Mg     2.00      22 Sep 2021 07:18    CAPILLARY BLOOD GLUCOSE  POCT Blood Glucose.: 95 mg/dL (22 Sep 2021 11:45)  POCT Blood Glucose.: 110 mg/dL (22 Sep 2021 07:41)  POCT Blood Glucose.: 213 mg/dL (21 Sep 2021 20:53)  POCT Blood Glucose.: 130 mg/dL (21 Sep 2021 17:03)    BLOOD CULTURE    RADIOLOGY & ADDITIONAL TESTS:    Imaging Personally Reviewed:  [ ] YES     < from: Transthoracic Echocardiogram (09.21.21 @ 15:29) >  Ejection Fraction (Jacque): 68 %  ------------------------------------------------------------------------  OBSERVATIONS:  Mitral Valve: Mitral annular calcification, otherwise  normal mitral valve. Minimal mitral regurgitation.  Aortic Root: Normal aortic root.  Aortic Valve: Calcified trileaflet aortic valve with normal  opening.  Left Atrium: Normal left atrium.  LA volume index = 20  cc/m2.  Left Ventricle: Normal left ventricular systolic function.  No segmental wall motion abnormalities. Normal left  ventricular internal dimensions and wall thicknesses. Mild  diastolic dysfunction (Stage I).  Right Heart: Normal right atrium. Normal right ventricular  size and function. Normal tricuspid valve.  Minimal  tricuspid regurgitation. Normal pulmonic valve.  Pericardium/PleuraNormal pericardium with no pericardial  effusion.  ------------------------------------------------------------------------  CONCLUSIONS:  1. Mitral annular calcification, otherwise normal mitral  valve. Minimal mitral regurgitation.  2. Normal left ventricular internal dimensions and wall  thicknesses.  3. Normal left ventricular systolic function. No segmental  wall motion abnormalities.  4. Mild diastolic dysfunction (Stage I).  5. Normal right ventricular size and function.    < end of copied text >      Consultant(s) Notes Reviewed:      Care Discussed with Consultants/Other Providers: TONI Galloway re: BP better controlled, echo wnl, d/c planning today to home, patient in agreement with d/c plan, requests meds sent to [facility] anticipatory guidance provided

## 2021-09-22 NOTE — PROGRESS NOTE ADULT - PROVIDER SPECIALTY LIST ADULT
Hospitalist
Endocrinology
Hospitalist
Hospitalist
Endocrinology
Hospitalist
Endocrinology
Hospitalist
Endocrinology
Hospitalist

## 2021-09-22 NOTE — PROGRESS NOTE ADULT - ASSESSMENT
66 year old woman with PMH HTN, HLD, DM2, presents with elevated blood pressure, also with hyperglycemia in setting of uncontrolled DM2. HbA1c 12.2%.     1. Uncontrolled type 2 diabetes mellitus with hyperglycemia  HbA1c 12.2%, recently prescribed Januvia 25 mg 1 week ago, otherwise no DM medications. Does not have prescription insurance    While inpatient:  BG target 100-180 mg/dl  Creatinine higher today, thus will monitor on Lantus 20 units SQ qHS  Adjust Admelog to 6/7/7 (Hold if NPO/not eating meal)   Continue Admelog LOW dose correctional scale before meals, low dose at bedtime  Consistent carbohydrate diet, recommend RD consult  RD consult  Check BG before meals and bedtime    Discharge Plan:  Patient without insurance and A1c 12.2%, requires insulin however she is unable to self inject with either vials or pens  Therefore, will plan on dc on oral agents: Prandin 1 mg before meals and Actos 15 mg daily  Given declining GFR, would prefer to avoid sulfonylurea  We discussed need for dietary changes and improved glycemic control to prevent further decline in GFR as she already has CKD stage 4. Also discussed need for adequate BP control.   She will need glucometer, glucose test strips, lancets, alcohol swabs and insulin PEN needles - please prescribe talking glucometer     2. Essential hypertension  Remains hypertensive, goal BP < 130/80  On Norvasc and Labetalol, defer management to primary team    3. Hyperlipidemia  , severely elevated  Should be treated with mod/high intensity statin such as Lipitor 40 mg qhs as long as there is no contraindication    Quincy Mogran MD   Pager # [phone]  On evenings and weekends, please call the office at [phone] or page endocrine fellow on call. Please note that this patient may be followed by different provider tomorrow. If no answer, contact the office. 
65 yo F w/ HTN, HLD, DM2 c/b hyperglycemia, CKDIV?, a/w hypertensive urgency, BP now better controlled, stable for discharge with outpatient followup.     # Hypertensive urgency  - ?setting of poor health literacy and non-adherence?  - c/w amlodipine and labetalol  - monitor BP and adjust regimen as needed (is at max dose amlodipine, can increase dose/frequency of labetalol as needed)  - goal SBP <130 given diabetes  - SBP most recently 100-140s, counseled patient on need to followup w/ otpt PMD for monitoring of BP and adjusting meds as needed, can discharge on current regimen, patient denies lightheadedness  - denies current CP, had abnormal EKG on admission, Echo reviewed, normal LV systolic function, trop 27 -> 28, unlikely to be ACS    # Uncontrolled DM2 c/b hyperglycemia, CKD  - holding home januvia at this time  - A1C 12.4  - endocrine input appreciated, currently on Lantus 20, admelog 5/7/7U, sliding scale coverage as per endocrine   - given insurance issues and inability to self administer insulin via vial or pen, endocrine recommending d/c on oral agents: prandin 1 mg before meals, and actos 15 mg daily, avoid sulfonylurea given CKD, can continue home januvia  - had counseled patient and  on importance of otpt endo f/u (as well as eye and foot care)    # HLD  - c/w statin  - LDL elevated    # CKD IV  - review of Cr trend c/w CKD IV  - would benefit from otpt renal f/u  - continue to monitor renal function, avoid nephrotoxins    # Need for prophylactic measure  - VTE ppx: HSQ    Dispo: d/c home today, anticipatory guidance provided, meds to be sent to [facility], patient in agreement w/ discharge plan.     Discharge time 35 minutes        
65 yo F w/ HTN, HLD, DM2 c/b hyperglycemia, CKDIV?, a/w hypertensive urgency, BP now better controlled (though not yet at goal).     # Hypertensive urgency  - ?setting of poor health literacy and non-adherence?  - c/w amlodipine and labetalol tid  - monitor BP and adjust regimen as needed (is at max dose amlodipine, can increase dose/frequency of labetalol as needed)  - SBP 130s earlier this morning, most recently 140s  - goal SBP <130 given diabetes  - denies current CP, had abnormal EKG on admission, Echo pending, trop 27 -> 28, unlikely to be ACS    # Uncontrolled DM2 c/b hyperglycemia, CKD  - holding home januvia at this time  - A1C 12.4  - endocrine input appreciated, currently on Lantus 20, admelog 5/7/7U, sliding scale coverage as per endocrine (decreased morning admelog dose given FS 90 prelunch)  - given insurance issues and inability to self administer insulin via vial or pen, endocrine recommending d/c on oral agents: prandin 1 mg before meals, and actos 15 mg daily, avoid sulfonylurea given CKD, can continue home januvia  - had counseled patient and  on importance of otpt endo f/u (as well as eye and foot care)    # HLD  - c/w statin  - LDL elevated    # CKD IV  - review of Cr trend c/w CKD IV  - would benefit from otpt renal f/u  - continue to monitor renal function, avoid nephrotoxins    # Need for prophylactic measure  - VTE ppx: HSQ    Dispo: pending optimization of medication regimen, team expediting echo  
66 year old woman with PMH HTN, HLD, DM2, presents with elevated blood pressure, also with hyperglycemia in setting of uncontrolled DM2. HbA1c 12.2%.     1. Uncontrolled type 2 diabetes mellitus with hyperglycemia  HbA1c 12.2%, recently prescribed Januvia 25 mg 1 week ago, otherwise no DM medications. Does not have prescription insurance    While inpatient:  BG target 100-180 mg/dl  Continue Lantus 20 units SQ qHS  Adjust Admelog to 5/7/7 (Hold if NPO/not eating meal)   Continue Admelog LOW dose correctional scale before meals, low dose at bedtime  Consistent carbohydrate diet, RD consult  Check BG before meals and bedtime    Discharge Plan:  Patient without insurance and A1c 12.2%, requires insulin however she is unable to self inject with either vials or pens due to poor vision   Therefore, will plan on dc on oral agents: Prandin 1 mg before meals and Actos 15 mg daily. Can continue the Januvia 25 mg daily she has at home as well (although it is a limited supply), it has been renally dosed.   Given declining GFR, would prefer to avoid sulfonylurea  We discussed need for dietary changes and improved glycemic control to prevent further decline in GFR as she already has CKD stage 4. Also discussed need for adequate BP control.   She will need glucometer, glucose test strips, lancets, alcohol swabs and insulin PEN needles - please prescribe talking glucometer     2. Essential hypertension  Remains hypertensive, goal BP < 130/80  On Norvasc and Labetalol, defer management to primary team    3. Hyperlipidemia  , severely elevated  Should be treated with mod/high intensity statin such as Lipitor 40 mg qhs as long as there is no contraindication    Sussy Wall  Nurse Practitioner  Division of Endocrinology & Diabetes  In house pager #[phone]/long range pager #[phone]    If before 9AM or after 6PM, or on weekends/holidays, please call endocrine answering service for assistance ([phone]).  For nonurgent matters email [email or web address] for assistance. 
67 yo F w/ HTN, HLD, DM2 c/b hyperglycemia, CKDIV?, a/w hypertensive urgency, BP now better controlled (though not yet at goal).     # Hypertensive urgency  - ?setting of poor health literacy and non-adherence?  - c/w amlodipine and labetalol tid  - monitor BP and adjust regimen as needed (is at max dose amlodipine, can increase dose/frequency of labetalol as needed)  - /73 at 12:30 today  - goal SBP <130 given diabetes  - denies current CP, had abnormal EKG on admission, Echo pending, trop 27 -> 28, unlikely to be ACS    # Uncontrolled DM2 c/b hyperglycemia, CKD  - holding home januvia at this time  - A1C 12.4  - endocrine input appreciated, currently on Lantus 20, admelog 6/7/7U, sliding scale coverage as per endocrine  - given insurance issues and inability to self administer insulin via vial or pen, endocrine recommending d/c on oral agents: prandin 1 mg before meals, and actos 15 mg daily, avoid sulfonylurea given CKD  - counseled patient and  on importance of otpt endo f/u (as well as eye and foot care)    # HLD  - c/w statin  - LDL elevated    # CKD IV  - review of Cr trend c/w CKD IV  - would benefit from otpt renal f/u  - continue to monitor renal function, avoid nephrotoxins    # Need for prophylactic measure  - VTE ppx: HSQ    Dispo: pending optimization of medication regimen  
66 year old woman with PMH HTN, HLD, DM2, presents with elevated blood pressure, also with hyperglycemia in setting of uncontrolled DM2. HbA1c 12.2%.     1. Uncontrolled type 2 diabetes mellitus with hyperglycemia  HbA1c 12.2%, recently prescribed Januvia 25 mg 1 week ago, otherwise no DM medications. Does not have prescription insurance    While inpatient:  BG target 100-180 mg/dl  Increase Lantus to 20 units SQ qHS  Continue Admelog 7 units SQ TID before meals (Hold if NPO/not eating meal)   Continue Admelog LOW dose correctional scale before meals, low dose at bedtime  Consistent carbohydrate diet, recommend RD consult  Will add Glucerna supplement (once daily) for now but please followup RD recs   Check BG before meals and bedtime    Discharge Plan:  Patient without insurance and A1c 12.2%, requires insulin. Will use plan to use 70/30 mixed insulin for discharge  Due to patient's poor vision, she cannot use vial and syringe   Therefore, can be prescribed Relion 70/30 PENS which are available from [employer] (dose TBD)  If refuses this plan, alternative is orals (ie: low dose sulfonyurea or Prandin before meals + Actos, but this regimen is not ideal given A1c and CKD stage 4).  She will need glucometer, glucose test strips, lancets, alcohol swabs and insulin PEN needles     2. Essential hypertension  Remains hypertensive, goal BP < 130/80  On Norvasc and Labetalol, defer management to primary team    3. Hyperlipidemia  , severely elevated  Should be treated with mod/high intensity statin such as Lipitor 40 mg qhs as long as there is no contraindication    Sussy Wall  Nurse Practitioner  Division of Endocrinology & Diabetes  In house pager #[phone]/long range pager #[phone]    If before 9AM or after 6PM, or on weekends/holidays, please call endocrine answering service for assistance ([phone]).  For nonurgent matters email [email or web address] for assistance. 
66 year old woman with PMH HTN, HLD, DM2, presents with elevated blood pressure, also with hyperglycemia in setting of uncontrolled DM2. HbA1c 12.2%.     1. Uncontrolled type 2 diabetes mellitus with hyperglycemia  HbA1c 12.2%, recently prescribed Januvia 25 mg 1 week ago, otherwise no DM medications. Does not have prescription insurance    While inpatient:  BG target 100-180 mg/dl  Increase Lantus to 15 units SQ qHS  Increase Admelog to 7 units SQ TID before meals (Hold if NPO/not eating meal)   Continue Admelog LOW dose correctional scale before meals, low dose at bedtime  Consistent carbohydrate diet, recommend RD consult  Will add Glucerna supplement for now but please followup RD recs   Check BG before meals and bedtime    Discharge Plan:  Patient without insurance and A1c 12.2%, requires insulin. Will use plan to use 70/30 mixed insulin for discharge  Due to patient's poor vision, she cannot use vial and syringe   Therefore, can be prescribed Relion 70/30 PENS which are available from [employer] (dose TBD)  If refuses this plan, alternative is orals (ie: low dose sulfonyurea or Prandin before meals + Actos, but this regimen is not ideal given A1c and CKD stage 4).  She will need glucometer, glucose test strips, lancets, alcohol swabs and insulin PEN needles     2. Essential hypertension  Remains hypertensive, goal BP < 130/80  On Norvasc and Labetalol, defer management to primary team    3. Hyperlipidemia  , severely elevated  Should be treated with mod/high intensity statin such as Lipitor 40 mg qhs as long as there is no contraindication    Sussy Wall  Nurse Practitioner  Division of Endocrinology & Diabetes  In house pager #[phone]/long range pager #[phone]    If before 9AM or after 6PM, or on weekends/holidays, please call endocrine answering service for assistance ([phone]).  For nonurgent matters email [email or web address] for assistance. 
67 y/o F with hx of HTN, diabetes type 2 presents with elevated blood pressure. admitted for hypertensive urgency
65 y/o F with hx of HTN, diabetes type 2 presents with elevated blood pressure. admitted for hypertensive urgency
65 y/o F with hx of HTN, diabetes type 2 presents with elevated blood pressure. admitted for hypertensive urgency

## 2023-08-23 NOTE — ED ADULT NURSE NOTE - BREATH SOUNDS, MLM
History and Physical      Name:  Donovan Yang /Age/Sex: 1975  (57 y.o. female)   MRN & CSN:  [id number] & [id number] Encounter Date/Time: 2023 5:16 AM EDT   Location:  [id number] PCP: Brunilda Orantes [address] Day: 1    Assessment and Plan:     #. Paresthesia-evaluate for CVA  -Last well-known-more than 2 week ago  -NIHSS-1 on presentation  -CT head-age indeterminate lacunar infarct right basal ganglia, no acute intracranial abnormality. -CTA head and neck-no acute process  -NIHSS/PT/OT/SLP evaluation ordered  -MRI brain ordered  -Patient had 2D echo-3/2023-EF 55%, mild concentric left ventricular hypertrophy  -Continue aspirin, statin  -Neurology consult    #. Hypertension  -Patient is on carvedilol    #. Nonischemic dilated cardiomyopathy-2022  -Patient has seen cardiology-Dr. Catie Daugherty    #. Chronic anemia    #. H/o renal calculi-     #. Chronic tobacco dependence  -Patient admits to smoking less than half pack per day    #. History of drug abuse in the past  -As per Care Everywhere UDS positive for methamphetamine, opiate-  Disposition:   Current Living situation: home    Diet Diet NPO   DVT Prophylaxis [x] Lovenox, []  Heparin, [] SCDs, [] Ambulation,  [] Eliquis, [] Xarelto   Code Status Full Code   Surrogate Decision Maker/ POA      History from:   EMR, patient. History of Present Illness:     Chief Complaint: Stroke-like symptoms  Donovan Ynag is a 57 y.o. female with hypertension, nonischemic dilated cardiomyopathy, chronic tobacco dependence, history of renal calculi presented to [facility] ED with complaints of numbness of the left side of the body for the last 2 weeks. Patient reported having symptoms intermittently since the last 2 weeks, but constant and worse since yesterday. Patient denies any headache, visual disturbance, denies any speech abnormality, denied any dysphagia.   Denying any other complaints no chest pain, shortness of breath,
Clear

## 2024-01-01 NOTE — ED PROVIDER NOTE - NS ED MD DISPO DISCHARGE CCDA
colostrum colostrum transitional colostrum Patient/Caregiver provided printed discharge information.

## 2024-05-31 NOTE — H&P ADULT - NEGATIVE RESPIRATORY AND THORAX SYMPTOMS
no wheezing/no dyspnea/no cough
[place]: WBC 11.97, L shift, alk phos 137, other labs grossly wnl. Pain had mildly improved w/ initial meds, now much better after maalox/protonix. Wants to go home. Abd soft NTND, tolerating PO. Very well appearing.   already on a PPI at home.   Discussed importance of outpt follow up and return precautions. Clinically no indication for further emergent ED workup or hospitalization at this time. Stable for dc, outpt f/u.
